# Patient Record
Sex: FEMALE | Race: WHITE | NOT HISPANIC OR LATINO | URBAN - METROPOLITAN AREA
[De-identification: names, ages, dates, MRNs, and addresses within clinical notes are randomized per-mention and may not be internally consistent; named-entity substitution may affect disease eponyms.]

---

## 2018-01-11 ENCOUNTER — EMERGENCY (EMERGENCY)
Facility: HOSPITAL | Age: 28
LOS: 0 days | Discharge: HOME | End: 2018-01-11
Admitting: GENERAL PRACTICE

## 2018-01-11 DIAGNOSIS — S61.231A PUNCTURE WOUND WITHOUT FOREIGN BODY OF LEFT INDEX FINGER WITHOUT DAMAGE TO NAIL, INITIAL ENCOUNTER: ICD-10-CM
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
## 2018-10-03 ENCOUNTER — TRANSCRIPTION ENCOUNTER (OUTPATIENT)
Age: 28
End: 2018-10-03

## 2019-04-03 ENCOUNTER — INPATIENT (INPATIENT)
Facility: HOSPITAL | Age: 29
LOS: 0 days | Discharge: HOME | End: 2019-04-04
Attending: SURGERY | Admitting: SURGERY
Payer: COMMERCIAL

## 2019-04-03 ENCOUNTER — RESULT REVIEW (OUTPATIENT)
Age: 29
End: 2019-04-03

## 2019-04-03 VITALS
TEMPERATURE: 98 F | HEART RATE: 99 BPM | RESPIRATION RATE: 20 BRPM | OXYGEN SATURATION: 100 % | DIASTOLIC BLOOD PRESSURE: 74 MMHG | SYSTOLIC BLOOD PRESSURE: 117 MMHG

## 2019-04-03 LAB
ALBUMIN SERPL ELPH-MCNC: 4.1 G/DL — SIGNIFICANT CHANGE UP (ref 3.5–5.2)
ALP SERPL-CCNC: 40 U/L — SIGNIFICANT CHANGE UP (ref 30–115)
ALT FLD-CCNC: 20 U/L — SIGNIFICANT CHANGE UP (ref 0–41)
ANION GAP SERPL CALC-SCNC: 17 MMOL/L — HIGH (ref 7–14)
APPEARANCE UR: ABNORMAL
APTT BLD: 28.3 SEC — SIGNIFICANT CHANGE UP (ref 27–39.2)
AST SERPL-CCNC: 28 U/L — SIGNIFICANT CHANGE UP (ref 0–41)
BACTERIA # UR AUTO: ABNORMAL /HPF
BASOPHILS # BLD AUTO: 0.04 K/UL — SIGNIFICANT CHANGE UP (ref 0–0.2)
BASOPHILS NFR BLD AUTO: 0.4 % — SIGNIFICANT CHANGE UP (ref 0–1)
BILIRUB DIRECT SERPL-MCNC: <0.2 MG/DL — SIGNIFICANT CHANGE UP (ref 0–0.2)
BILIRUB INDIRECT FLD-MCNC: >0.1 MG/DL — LOW (ref 0.2–1.2)
BILIRUB SERPL-MCNC: 0.3 MG/DL — SIGNIFICANT CHANGE UP (ref 0.2–1.2)
BILIRUB UR-MCNC: NEGATIVE — SIGNIFICANT CHANGE UP
BLD GP AB SCN SERPL QL: SIGNIFICANT CHANGE UP
BUN SERPL-MCNC: 16 MG/DL — SIGNIFICANT CHANGE UP (ref 10–20)
CALCIUM SERPL-MCNC: 9.7 MG/DL — SIGNIFICANT CHANGE UP (ref 8.5–10.1)
CHLORIDE SERPL-SCNC: 103 MMOL/L — SIGNIFICANT CHANGE UP (ref 98–110)
CO2 SERPL-SCNC: 20 MMOL/L — SIGNIFICANT CHANGE UP (ref 17–32)
COLOR SPEC: YELLOW — SIGNIFICANT CHANGE UP
CREAT SERPL-MCNC: 0.9 MG/DL — SIGNIFICANT CHANGE UP (ref 0.7–1.5)
DIFF PNL FLD: NEGATIVE — SIGNIFICANT CHANGE UP
EOSINOPHIL # BLD AUTO: 0.11 K/UL — SIGNIFICANT CHANGE UP (ref 0–0.7)
EOSINOPHIL NFR BLD AUTO: 1 % — SIGNIFICANT CHANGE UP (ref 0–8)
EPI CELLS # UR: ABNORMAL /HPF
GLUCOSE SERPL-MCNC: 84 MG/DL — SIGNIFICANT CHANGE UP (ref 70–99)
GLUCOSE UR QL: NEGATIVE MG/DL — SIGNIFICANT CHANGE UP
HCT VFR BLD CALC: 37.5 % — SIGNIFICANT CHANGE UP (ref 37–47)
HGB BLD-MCNC: 12.9 G/DL — SIGNIFICANT CHANGE UP (ref 12–16)
IMM GRANULOCYTES NFR BLD AUTO: 0.2 % — SIGNIFICANT CHANGE UP (ref 0.1–0.3)
INR BLD: 0.96 RATIO — SIGNIFICANT CHANGE UP (ref 0.65–1.3)
KETONES UR-MCNC: NEGATIVE — SIGNIFICANT CHANGE UP
LEUKOCYTE ESTERASE UR-ACNC: ABNORMAL
LIDOCAIN IGE QN: 27 U/L — SIGNIFICANT CHANGE UP (ref 7–60)
LYMPHOCYTES # BLD AUTO: 46.5 % — SIGNIFICANT CHANGE UP (ref 20.5–51.1)
LYMPHOCYTES # BLD AUTO: 5 K/UL — HIGH (ref 1.2–3.4)
MCHC RBC-ENTMCNC: 29.7 PG — SIGNIFICANT CHANGE UP (ref 27–31)
MCHC RBC-ENTMCNC: 34.4 G/DL — SIGNIFICANT CHANGE UP (ref 32–37)
MCV RBC AUTO: 86.4 FL — SIGNIFICANT CHANGE UP (ref 81–99)
MONOCYTES # BLD AUTO: 0.78 K/UL — HIGH (ref 0.1–0.6)
MONOCYTES NFR BLD AUTO: 7.3 % — SIGNIFICANT CHANGE UP (ref 1.7–9.3)
NEUTROPHILS # BLD AUTO: 4.8 K/UL — SIGNIFICANT CHANGE UP (ref 1.4–6.5)
NEUTROPHILS NFR BLD AUTO: 44.6 % — SIGNIFICANT CHANGE UP (ref 42.2–75.2)
NITRITE UR-MCNC: NEGATIVE — SIGNIFICANT CHANGE UP
NRBC # BLD: 0 /100 WBCS — SIGNIFICANT CHANGE UP (ref 0–0)
PH UR: 6.5 — SIGNIFICANT CHANGE UP (ref 5–8)
PLATELET # BLD AUTO: 283 K/UL — SIGNIFICANT CHANGE UP (ref 130–400)
POTASSIUM SERPL-MCNC: 3.4 MMOL/L — LOW (ref 3.5–5)
POTASSIUM SERPL-SCNC: 3.4 MMOL/L — LOW (ref 3.5–5)
PROT SERPL-MCNC: 6.8 G/DL — SIGNIFICANT CHANGE UP (ref 6–8)
PROT UR-MCNC: NEGATIVE MG/DL — SIGNIFICANT CHANGE UP
PROTHROM AB SERPL-ACNC: 11 SEC — SIGNIFICANT CHANGE UP (ref 9.95–12.87)
RBC # BLD: 4.34 M/UL — SIGNIFICANT CHANGE UP (ref 4.2–5.4)
RBC # FLD: 12.4 % — SIGNIFICANT CHANGE UP (ref 11.5–14.5)
SODIUM SERPL-SCNC: 140 MMOL/L — SIGNIFICANT CHANGE UP (ref 135–146)
SP GR SPEC: 1.02 — SIGNIFICANT CHANGE UP (ref 1.01–1.03)
TYPE + AB SCN PNL BLD: SIGNIFICANT CHANGE UP
UROBILINOGEN FLD QL: 1 MG/DL (ref 0.2–0.2)
WBC # BLD: 10.75 K/UL — SIGNIFICANT CHANGE UP (ref 4.8–10.8)
WBC # FLD AUTO: 10.75 K/UL — SIGNIFICANT CHANGE UP (ref 4.8–10.8)
WBC UR QL: SIGNIFICANT CHANGE UP /HPF

## 2019-04-03 PROCEDURE — 76705 ECHO EXAM OF ABDOMEN: CPT | Mod: 26

## 2019-04-03 PROCEDURE — 47562 LAPAROSCOPIC CHOLECYSTECTOMY: CPT

## 2019-04-03 PROCEDURE — 93010 ELECTROCARDIOGRAM REPORT: CPT

## 2019-04-03 PROCEDURE — 88304 TISSUE EXAM BY PATHOLOGIST: CPT | Mod: 26

## 2019-04-03 PROCEDURE — 71046 X-RAY EXAM CHEST 2 VIEWS: CPT | Mod: 26

## 2019-04-03 PROCEDURE — 99223 1ST HOSP IP/OBS HIGH 75: CPT | Mod: 57

## 2019-04-03 PROCEDURE — 99285 EMERGENCY DEPT VISIT HI MDM: CPT | Mod: 25

## 2019-04-03 RX ORDER — MORPHINE SULFATE 50 MG/1
2 CAPSULE, EXTENDED RELEASE ORAL EVERY 4 HOURS
Qty: 0 | Refills: 0 | Status: DISCONTINUED | OUTPATIENT
Start: 2019-04-04 | End: 2019-04-04

## 2019-04-03 RX ORDER — HEPARIN SODIUM 5000 [USP'U]/ML
5000 INJECTION INTRAVENOUS; SUBCUTANEOUS EVERY 8 HOURS
Qty: 0 | Refills: 0 | Status: DISCONTINUED | OUTPATIENT
Start: 2019-04-04 | End: 2019-04-04

## 2019-04-03 RX ORDER — IBUPROFEN 200 MG
400 TABLET ORAL EVERY 8 HOURS
Qty: 0 | Refills: 0 | Status: DISCONTINUED | OUTPATIENT
Start: 2019-04-04 | End: 2019-04-04

## 2019-04-03 RX ORDER — SODIUM CHLORIDE 9 MG/ML
1000 INJECTION, SOLUTION INTRAVENOUS
Qty: 0 | Refills: 0 | Status: DISCONTINUED | OUTPATIENT
Start: 2019-04-03 | End: 2019-04-03

## 2019-04-03 RX ORDER — ONDANSETRON 8 MG/1
4 TABLET, FILM COATED ORAL ONCE
Qty: 0 | Refills: 0 | Status: COMPLETED | OUTPATIENT
Start: 2019-04-03 | End: 2019-04-03

## 2019-04-03 RX ORDER — SODIUM CHLORIDE 9 MG/ML
1000 INJECTION, SOLUTION INTRAVENOUS
Qty: 0 | Refills: 0 | Status: DISCONTINUED | OUTPATIENT
Start: 2019-04-04 | End: 2019-04-04

## 2019-04-03 RX ORDER — CHLORHEXIDINE GLUCONATE 213 G/1000ML
1 SOLUTION TOPICAL
Qty: 0 | Refills: 0 | Status: DISCONTINUED | OUTPATIENT
Start: 2019-04-03 | End: 2019-04-03

## 2019-04-03 RX ORDER — ACETAMINOPHEN 500 MG
650 TABLET ORAL EVERY 6 HOURS
Qty: 0 | Refills: 0 | Status: DISCONTINUED | OUTPATIENT
Start: 2019-04-04 | End: 2019-04-04

## 2019-04-03 RX ORDER — KETOROLAC TROMETHAMINE 30 MG/ML
30 SYRINGE (ML) INJECTION ONCE
Qty: 0 | Refills: 0 | Status: DISCONTINUED | OUTPATIENT
Start: 2019-04-03 | End: 2019-04-03

## 2019-04-03 RX ORDER — HYDROMORPHONE HYDROCHLORIDE 2 MG/ML
0.5 INJECTION INTRAMUSCULAR; INTRAVENOUS; SUBCUTANEOUS
Qty: 0 | Refills: 0 | Status: DISCONTINUED | OUTPATIENT
Start: 2019-04-03 | End: 2019-04-03

## 2019-04-03 RX ORDER — PANTOPRAZOLE SODIUM 20 MG/1
40 TABLET, DELAYED RELEASE ORAL
Qty: 0 | Refills: 0 | Status: DISCONTINUED | OUTPATIENT
Start: 2019-04-03 | End: 2019-04-03

## 2019-04-03 RX ORDER — CEFOTETAN DISODIUM 1 G
1 VIAL (EA) INJECTION ONCE
Qty: 0 | Refills: 0 | Status: COMPLETED | OUTPATIENT
Start: 2019-04-04 | End: 2019-04-04

## 2019-04-03 RX ORDER — FAMOTIDINE 10 MG/ML
20 INJECTION INTRAVENOUS ONCE
Qty: 0 | Refills: 0 | Status: COMPLETED | OUTPATIENT
Start: 2019-04-03 | End: 2019-04-03

## 2019-04-03 RX ORDER — PANTOPRAZOLE SODIUM 20 MG/1
40 TABLET, DELAYED RELEASE ORAL
Qty: 0 | Refills: 0 | Status: DISCONTINUED | OUTPATIENT
Start: 2019-04-04 | End: 2019-04-04

## 2019-04-03 RX ORDER — SODIUM CHLORIDE 9 MG/ML
1000 INJECTION INTRAMUSCULAR; INTRAVENOUS; SUBCUTANEOUS ONCE
Qty: 0 | Refills: 0 | Status: COMPLETED | OUTPATIENT
Start: 2019-04-03 | End: 2019-04-03

## 2019-04-03 RX ORDER — MORPHINE SULFATE 50 MG/1
4 CAPSULE, EXTENDED RELEASE ORAL ONCE
Qty: 0 | Refills: 0 | Status: DISCONTINUED | OUTPATIENT
Start: 2019-04-03 | End: 2019-04-03

## 2019-04-03 RX ORDER — CHLORHEXIDINE GLUCONATE 213 G/1000ML
1 SOLUTION TOPICAL
Qty: 0 | Refills: 0 | Status: DISCONTINUED | OUTPATIENT
Start: 2019-04-04 | End: 2019-04-04

## 2019-04-03 RX ORDER — HEPARIN SODIUM 5000 [USP'U]/ML
5000 INJECTION INTRAVENOUS; SUBCUTANEOUS EVERY 8 HOURS
Qty: 0 | Refills: 0 | Status: DISCONTINUED | OUTPATIENT
Start: 2019-04-03 | End: 2019-04-03

## 2019-04-03 RX ORDER — HYDROMORPHONE HYDROCHLORIDE 2 MG/ML
1 INJECTION INTRAMUSCULAR; INTRAVENOUS; SUBCUTANEOUS
Qty: 0 | Refills: 0 | Status: DISCONTINUED | OUTPATIENT
Start: 2019-04-03 | End: 2019-04-03

## 2019-04-03 RX ADMIN — ONDANSETRON 4 MILLIGRAM(S): 8 TABLET, FILM COATED ORAL at 07:32

## 2019-04-03 RX ADMIN — HYDROMORPHONE HYDROCHLORIDE 0.5 MILLIGRAM(S): 2 INJECTION INTRAMUSCULAR; INTRAVENOUS; SUBCUTANEOUS at 22:42

## 2019-04-03 RX ADMIN — ONDANSETRON 4 MILLIGRAM(S): 8 TABLET, FILM COATED ORAL at 23:02

## 2019-04-03 RX ADMIN — SODIUM CHLORIDE 100 MILLILITER(S): 9 INJECTION, SOLUTION INTRAVENOUS at 15:18

## 2019-04-03 RX ADMIN — SODIUM CHLORIDE 1000 MILLILITER(S): 9 INJECTION INTRAMUSCULAR; INTRAVENOUS; SUBCUTANEOUS at 07:33

## 2019-04-03 RX ADMIN — FAMOTIDINE 104 MILLIGRAM(S): 10 INJECTION INTRAVENOUS at 07:33

## 2019-04-03 RX ADMIN — MORPHINE SULFATE 4 MILLIGRAM(S): 50 CAPSULE, EXTENDED RELEASE ORAL at 07:33

## 2019-04-03 NOTE — ED ADULT NURSE REASSESSMENT NOTE - NS ED NURSE REASSESS COMMENT FT1
pt resting comfortably, denies any discomfort. no distress noted at this time. iv intact, safety maintained, on monitor. VSS. awaiting OR.

## 2019-04-03 NOTE — ED PROVIDER NOTE - CLINICAL SUMMARY MEDICAL DECISION MAKING FREE TEXT BOX
27 yo female with abdominal pain due to cholelithiasis; LFTs WNL, seen by surgery , admit for cholecystectomy.

## 2019-04-03 NOTE — PRE-ANESTHESIA EVALUATION ADULT - NSANTHOSAYNRD_GEN_A_CORE
No. AIDA screening performed.  STOP BANG Legend: 0-2 = LOW Risk; 3-4 = INTERMEDIATE Risk; 5-8 = HIGH Risk

## 2019-04-03 NOTE — H&P ADULT - ASSESSMENT
ASSESSMENT:  28y Female with acute onset epigastric and RUQ abdominal pain mild tenderness on exam after receiving morphine in ED, US findings of large stones and SMS+.     PLAN:   NPO  IVF, BL IV access  GI DVT ppx  Cipro/flagyl  Pain control, morphine  Pre-op labs: T+S, Coags, EKG (NSR) and CXR  Will discuss surgical options, patient will benefit from laparoscopic cholecystectomy    Above plan discussed with Dr. Powers , patient, and ED team  --------------------------------------------------------------------------------------    04-03-19 @ 11:16

## 2019-04-03 NOTE — CHART NOTE - NSCHARTNOTEFT_GEN_A_CORE
PACU ANESTHESIA ADMISSION NOTE      Procedure: laparoscopic cholecystectomy  Post op diagnosis:  Acute cholecystitis      ____  Intubated  TV:______       Rate: ______      FiO2: ______    __x__  Patent Airway    ____  Full return of protective reflexes    ____  Full recovery from anesthesia / back to baseline status    Vitals:  T(C): 36.1   HR: 96   BP: 119/73  RR: 16  SpO2: 98%     Mental Status:  ___x_ Awake   _____ Alert   _____ Drowsy   _____ Sedated    Nausea/Vomiting:  ____ Yes, See Post - Op Orders      ___x_ No    Pain Scale (0-10):  _____    Treatment: ____ None    __x__ See Post - Op/PCA Orders    Post - Operative Fluids:   ____ Oral   ___x_ See Post - Op Orders    Plan: Discharge:   ____Home       ___x__Floor     _____Critical Care    _____  Other:_________________    Comments:  report given to RN

## 2019-04-03 NOTE — ED ADULT NURSE NOTE - OBJECTIVE STATEMENT
Pt reports she was at work and had a sharp cramping pain in the middle of the abdomen. Pt reports pain was so intense that it caused shortness of breath. Pt reports snacking a lot overnight. Denies vomiting, diarrhea, fever.

## 2019-04-03 NOTE — CONSULT NOTE ADULT - ASSESSMENT
ASSESSMENT:  28y Female with acute onset epigastric and RUQ abdominal pain mild tenderness on exam after receiving morphine in ED, US findings of large stones and SMS+.     PLAN:   NPO  IVF, BL IV access  Pain control, morphine  Pre-op labs: T+S, Coags, EKG (NSR) and CXR  Will discuss surgical options, patient will benefit from laparoscopic cholecystectomy    Above plan discussed with Dr. Powers , patient, and ED team    --------------------------------------------------------------------------------------    04-03-19 @ 11:16

## 2019-04-03 NOTE — ED PROVIDER NOTE - NS ED ROS FT
Constitutional: (-) fever, (-) chills, (+)diaphoresis  Eyes/ENT: (-) blurry vision, (-) epistaxis, (-) sore throat  Cardiovascular: (-) chest pain, (-) syncope  Respiratory: (-) cough, (-) shortness of breath  Gastrointestinal: (-) vomiting, (-) diarrhea, (+) epigastric pain, (-) black or bloody stools  : (-) dysuria or hematuria, no frequency or urgency   Musculoskeletal: (-) neck pain, (-) back pain, (-) joint pain  Integumentary: (-) rash, (-) edema  Neurological: (-) headache, (-) altered mental status, (-) focal weakness or paresthesias  Allergic/Immunologic: (-) pruritus

## 2019-04-03 NOTE — H&P ADULT - HISTORY OF PRESENT ILLNESS
HPI:   28y Female no PMH/PSH, presents with 1 hour of acute onset, sharp, aching pain in the epigastrium, radiating to the RUQ and back. Pain has been constant, associated diaphoresis and nausea. Pain resolved with morphine, zofran, and IVF in ED. No vomiting, fevers, chills, dairrhea. She has never had an episode of similar pain. No prior EGD/C-scope, not on NSAIDs and lifetime non-smoker.     PAST MEDICAL & SURGICAL HISTORY:  No pertinent past medical history  No significant past surgical history    Home Meds: Home Medications: none    Allergies: Allergies  No Known Allergies  Intolerances

## 2019-04-03 NOTE — H&P ADULT - NSHPLABSRESULTS_GEN_ALL_CORE
LABS  --------------------------------------------------------------------------------------  CAPILLARY BLOOD GLUCOSE                        12.9   10.75 )-----------( 283      ( 2019 07:22 )             37.5       Auto Immature Granulocyte %: 0.2 % (19 @ 07:22)  Auto Neutrophil %: 44.6 % (19 @ 07:22)      140  |  103  |  16  ----------------------------<  84  3.4<L>   |  20  |  0.9    Calcium, Total Serum: 9.7 mg/dL (19 @ 07:22)    LFTs:         6.8  | 0.3  | 28       ------------------[40      ( 2019 07:22 )  4.1  | <0.2 | 20          Lipase:27     Amylase:x        Coags: x    Urinalysis Basic - ( 2019 07:22 )  Color: Yellow / Appearance: Cloudy / S.020 / pH: x  Gluc: x / Ketone: Negative  / Bili: Negative / Urobili: 1.0 mg/dL   Blood: x / Protein: Negative mg/dL / Nitrite: Negative   Leuk Esterase: Small / RBC: x / WBC 3-5 /HPF   Sq Epi: x / Non Sq Epi: Occasional /HPF / Bacteria: Few /HPF    --------------------------------------------------------------------------------------  IMAGING RESULTS  < from: US Abdomen Limited (19 @ 08:28) >  IMPRESSION:  Cholelithiasis without gallbladder wall thickening or pericholecystic   fluid, however a positive sonographic Weldon sign was reported. A nuclear   medicine HIDA scan can be obtained to further evaluate for acute   cholecystitis.  < end of copied text >  ---------------------------------------------------------------------------------------

## 2019-04-03 NOTE — ED PROVIDER NOTE - OBJECTIVE STATEMENT
27 yo female without any significant PMH c/o acute onset of severe sharp constant epigastric pain radiating to the back and RUQ associated with nausea , diaphoresis. Onset of pain whole giving report to another nurse.  No such symptoms in th past.  No associated fever, chills, dizziness, CP, focal weakness, paresthesias, urinary complaints, vaginal bleeding.  No recent illness, LBM this morning, ate a [place] kiss this morning.  Will treat pain, give antiemetics, check labs, RUQ sono, reasess.

## 2019-04-03 NOTE — ED PROVIDER NOTE - PROGRESS NOTE DETAILS
Pain is gone, patient appears well, labs are pending. RUQ sono positive for stones, labs WNL, case d/c surgery resident who will evaluated the patient in ED. Patient was evaluated by surgery resident,  will wait for 4 hours post Morphine to evaluate if pain returns. Will admit for cholecystectomy.

## 2019-04-03 NOTE — H&P ADULT - NSHPPHYSICALEXAM_GEN_ALL_CORE
--------------------------------------------------------------------------------------  VITAL SIGNS, INS/OUTS (last 24 hours):  --------------------------------------------------------------------------------------  ICU Vital Signs Last 24 Hrs  T(C): 36.8 (03 Apr 2019 07:51), Max: 36.8 (03 Apr 2019 07:51)  T(F): 98.2 (03 Apr 2019 07:51), Max: 98.2 (03 Apr 2019 07:51)  HR: 99 (03 Apr 2019 07:51) (99 - 99)  BP: 117/74 (03 Apr 2019 07:51) (117/74 - 117/74)  RR: 20 (03 Apr 2019 07:51) (20 - 20)  SpO2: 100% (03 Apr 2019 07:51) (100% - 100%)  --------------------------------------------------------------------------------------  PHYSICAL EXAM  General: NAD, AAOx3, calm and cooperative  HEENT: NCAT, Neck supple  Cardiac: RRR S1, S2  Respiratory: CTAB, normal respiratory effort  Abdomen: Soft, non-distended, mild RUQ tenderness, no rebound or rigidity, no pepper sign, +bowel sounds  Skin: Warm/dry, normal color, no jaundice

## 2019-04-03 NOTE — CONSULT NOTE ADULT - SUBJECTIVE AND OBJECTIVE BOX
Consultation Note  =====================================================    HPI:   28y Female no PMH/PSH, presents with 1 hour of acute onset, sharp, aching pain in the epigastrium, radiating to the RUQ and back. Pain has been constant, associated diaphoresis and nausea. Pain resolved with morphine, zofran, and IVF in ED. No vomiting, fevers, chills, dairrhea. She has never had an episode of similar pain. No prior EGD/C-scope, not on NSAIDs and lifetime non-smoker.     PAST MEDICAL & SURGICAL HISTORY:  No pertinent past medical history  No significant past surgical history    Home Meds: Home Medications: none    Allergies: Allergies  No Known Allergies  Intolerances    Soc:   Denies Tobacco/EtOH/Substance use  Advanced Directives: Presumed Full Code     ROS:    REVIEW OF SYSTEMS  [ x ] A ten-point review of systems was otherwise negative except as noted.  [ ] Due to altered mental status/intubation, subjective information were not able to be obtained from the patient. History was obtained, to the extent possible, from review of the chart and collateral sources of information.    --------------------------------------------------------------------------------------  VITAL SIGNS, INS/OUTS (last 24 hours):  --------------------------------------------------------------------------------------  ICU Vital Signs Last 24 Hrs  T(C): 36.8 (2019 07:51), Max: 36.8 (2019 07:51)  T(F): 98.2 (2019 07:51), Max: 98.2 (2019 07:51)  HR: 99 (2019 07:51) (99 - 99)  BP: 117/74 (2019 07:51) (117/74 - 117/74)  RR: 20 (2019 07:51) (20 - 20)  SpO2: 100% (2019 07:51) (100% - 100%)  --------------------------------------------------------------------------------------  PHYSICAL EXAM  General: NAD, AAOx3, calm and cooperative  HEENT: NCAT, Neck supple  Cardiac: RRR S1, S2  Respiratory: CTAB, normal respiratory effort  Abdomen: Soft, non-distended, mild RUQ tenderness, no rebound or rigidity, no weldon sign, +bowel sounds  Skin: Warm/dry, normal color, no jaundice    LABS  --------------------------------------------------------------------------------------  CAPILLARY BLOOD GLUCOSE                        12.9   10.75 )-----------( 283      ( 2019 07:22 )             37.5       Auto Immature Granulocyte %: 0.2 % (19 @ 07:22)  Auto Neutrophil %: 44.6 % (19 @ 07:22)      140  |  103  |  16  ----------------------------<  84  3.4<L>   |  20  |  0.9    Calcium, Total Serum: 9.7 mg/dL (19 @ 07:22)    LFTs:         6.8  | 0.3  | 28       ------------------[40      ( 2019 07:22 )  4.1  | <0.2 | 20          Lipase:27     Amylase:x        Coags: x    Urinalysis Basic - ( 2019 07:22 )  Color: Yellow / Appearance: Cloudy / S.020 / pH: x  Gluc: x / Ketone: Negative  / Bili: Negative / Urobili: 1.0 mg/dL   Blood: x / Protein: Negative mg/dL / Nitrite: Negative   Leuk Esterase: Small / RBC: x / WBC 3-5 /HPF   Sq Epi: x / Non Sq Epi: Occasional /HPF / Bacteria: Few /HPF    --------------------------------------------------------------------------------------  IMAGING RESULTS  < from: US Abdomen Limited (19 @ 08:28) >  IMPRESSION:  Cholelithiasis without gallbladder wall thickening or pericholecystic   fluid, however a positive sonographic Wedlon sign was reported. A nuclear   medicine HIDA scan can be obtained to further evaluate for acute   cholecystitis.  < end of copied text >  ---------------------------------------------------------------------------------------

## 2019-04-04 ENCOUNTER — TRANSCRIPTION ENCOUNTER (OUTPATIENT)
Age: 29
End: 2019-04-04

## 2019-04-04 VITALS
RESPIRATION RATE: 18 BRPM | TEMPERATURE: 96 F | SYSTOLIC BLOOD PRESSURE: 115 MMHG | HEART RATE: 88 BPM | DIASTOLIC BLOOD PRESSURE: 65 MMHG

## 2019-04-04 RX ORDER — OXYCODONE HYDROCHLORIDE 5 MG/1
1 TABLET ORAL
Qty: 5 | Refills: 0 | OUTPATIENT
Start: 2019-04-04

## 2019-04-04 RX ORDER — ACETAMINOPHEN 500 MG
2 TABLET ORAL
Qty: 0 | Refills: 0 | COMMUNITY
Start: 2019-04-04

## 2019-04-04 RX ORDER — IBUPROFEN 200 MG
1 TABLET ORAL
Qty: 0 | Refills: 0 | COMMUNITY
Start: 2019-04-04

## 2019-04-04 RX ORDER — ACETAMINOPHEN 500 MG
650 TABLET ORAL EVERY 6 HOURS
Qty: 0 | Refills: 0 | Status: DISCONTINUED | OUTPATIENT
Start: 2019-04-04 | End: 2019-04-04

## 2019-04-04 RX ADMIN — Medication 650 MILLIGRAM(S): at 05:21

## 2019-04-04 RX ADMIN — Medication 650 MILLIGRAM(S): at 02:44

## 2019-04-04 RX ADMIN — PANTOPRAZOLE SODIUM 40 MILLIGRAM(S): 20 TABLET, DELAYED RELEASE ORAL at 05:22

## 2019-04-04 RX ADMIN — Medication 400 MILLIGRAM(S): at 05:40

## 2019-04-04 RX ADMIN — Medication 400 MILLIGRAM(S): at 05:22

## 2019-04-04 RX ADMIN — Medication 650 MILLIGRAM(S): at 03:15

## 2019-04-04 RX ADMIN — Medication 650 MILLIGRAM(S): at 05:40

## 2019-04-04 NOTE — PROGRESS NOTE ADULT - ASSESSMENT
28y Female s/p lap alexandra for acute cholecystitis     PLAN:   Will advance diet this AM   IVL when tolerating diet  Pain control  encourage ambulation and IS   possible d/c today if tolerating diet

## 2019-04-04 NOTE — DISCHARGE NOTE PROVIDER - NSDCCPCAREPLAN_GEN_ALL_CORE_FT
PRINCIPAL DISCHARGE DIAGNOSIS  Diagnosis: Acute cholecystitis  Assessment and Plan of Treatment: Continue regular diet.  Dressings : Remove outside dressing in 2 days, OK to shower normally. Leave steri-strips in place, they will fall off on their own in 1 week.  Pain : Take ibuprofen, tylenol around the clock (every 8 hours) for at least three days. Take oxycodone 5mg as needed for breakthrough pain. Please be aware, the medication can cause drowsiness, so reserve for night time use.   Activity : Please avoid heavy lifting (anything over 10 pounds) for at least 6 weeks.   Follow up : Call to schedule a follow up appointment in 2 weeks, [phone]

## 2019-04-04 NOTE — PROGRESS NOTE ADULT - ASSESSMENT
Assessment:  28y Female patient admitted S/P david herrmann    Plan:  -home meds  -pain control  -GI/DVT ppx  -encourage ambulation  -incentive spirometer  -regular diet  -IVL  -d/c home today    Date/Time: 04-04-19 @ 09:54

## 2019-04-04 NOTE — DISCHARGE NOTE NURSING/CASE MANAGEMENT/SOCIAL WORK - NSDCDPATPORTLINK_GEN_ALL_CORE
You can access the [facility] Patient Portal, offered by [facility], by registering with the following website: [email or web address]

## 2019-04-04 NOTE — PROGRESS NOTE ADULT - SUBJECTIVE AND OBJECTIVE BOX
Progress Note: Surgery  Patient: ANA MARIA BANUELOS , 28y (1990)Female   MRN: [id number]  Location: [place] [address] A  Visit: 04-03-19 Inpatient  Date: 04-04-19 @ 01:57    Procedure/Injury: s/p lap alexandra     Events over 24h: patient tolerated procedure well, ambulating post op, afebrile overnight, will advance diet this am    Vitals: T(F): 96.9 (04-04-19 @ 00:00), Max: 99.4 (04-03-19 @ 22:32)  HR: 80 (04-04-19 @ 00:00)  BP: 114/53 (04-04-19 @ 00:00) (103/62 - 129/61)  RR: 18 (04-04-19 @ 00:00)  SpO2: 96% (04-03-19 @ 23:02)    Bowel function:   Bowel movement [no]   Flatus [no]    Physical Examination:  General: NAD, lying in bed comfortably   HEENT: NCAT, BECCA, WNL  Heart: S1 S2, No MRG RRR   Lungs: CTABL +BS Equal BL, No WRC  Abdomen: Soft, non-distended, incisional tenderness.   Incisions/Wounds: Dressings in place, clean, dry and intact, no signs of infection/active bleeding/drainage    Medications: [Standing]  cefoTEtan  IVPB 1 Gram(s) IV Intermittent once    Medications:[PRN]    Labs:                        12.9   10.75 )-----------( 283      ( 03 Apr 2019 07:22 )             37.5     04-03    140  |  103  |  16  ----------------------------<  84  3.4<L>   |  20  |  0.9    Ca    9.7      03 Apr 2019 07:22    TPro  6.8  /  Alb  4.1  /  TBili  0.3  /  DBili  <0.2  /  AST  28  /  ALT  20  /  AlkPhos  40  04-03    LIVER FUNCTIONS - ( 03 Apr 2019 07:22 )  Alb: 4.1 g/dL / Pro: 6.8 g/dL / ALK PHOS: 40 U/L / ALT: 20 U/L / AST: 28 U/L / GGT: x           PT/INR - ( 03 Apr 2019 11:57 )   PT: 11.00 sec;   INR: 0.96 ratio    PTT - ( 03 Apr 2019 11:57 )  PTT:28.3 sec    Date/Time: 04-04-19 @ 01:57

## 2019-04-04 NOTE — PROGRESS NOTE ADULT - SUBJECTIVE AND OBJECTIVE BOX
Progress Note: General Surgery  Patient: ANA MARIA BANUELOS , 28y (1990)Female   MRN: [id number]  Location: [address]  Visit: 19 Inpatient  Date: 19 @ 09:54  Hospital Day: 2  Post-op Day: 1    Procedure/Diagnosis: s/p lap choley  Events over 24h: No acute overnight events, patient ambulating, tolerating regular diet, voiding with no issues.  Denies fever, chills, chest pain, shortness of breath, nausea, vomiting.    Vitals: T(F): 96.1 (19 @ 08:00), Max: 99.4 (19 @ 22:32)  HR: 88 (19 @ 08:00)  BP: 115/65 (19 @ 08:00) (103/62 - 129/61)  RR: 18 (19 @ 08:00)  SpO2: 96% (19 @ 23:02)    In:   19 @ 07:01  -  19 @ 07:00  --------------------------------------------------------  IN: 500 mL      Out:   19 @ 07:01  -  19 @ 07:00  --------------------------------------------------------  OUT:  Total OUT: 0 mL        Net:   19 @ 07:01  -  19 @ 07:00  --------------------------------------------------------  NET: 500 mL      Diet: Diet, Regular (19 @ 22:41)    Physical Examination:  General Appearance: NAD, alert and cooperative  HEENT: NCAT, WNL  Heart: S1 and S2. No murmurs. Rhythm is regular  Lungs: Clear to auscultation BL  Abdomen:  Positive bowel sounds. Soft, nondistended, pooja-incisional    Medications: [Standing]  acetaminophen   Tablet .. 650 milliGRAM(s) Oral every 6 hours  cefoTEtan  IVPB 1 Gram(s) IV Intermittent once  chlorhexidine 4% Liquid 1 Application(s) Topical <User Schedule>  heparin  Injectable 5000 Unit(s) SubCutaneous every 8 hours  ibuprofen  Tablet. 400 milliGRAM(s) Oral every 8 hours  pantoprazole    Tablet 40 milliGRAM(s) Oral before breakfast    DVT Prophylaxis: heparin  Injectable 5000 Unit(s) SubCutaneous every 8 hours    GI Prophylaxis: pantoprazole    Tablet 40 milliGRAM(s) Oral before breakfast    Antibiotics: cefoTEtan  IVPB 1 Gram(s) IV Intermittent once    Anticoagulation:   Medications:[PRN]  acetaminophen   Tablet .. 650 milliGRAM(s) Oral every 6 hours PRN  morphine  - Injectable 2 milliGRAM(s) IV Push every 4 hours PRN    Labs:                        12.9   10.75 )-----------( 283      ( 2019 07:22 )             37.5     04-03    140  |  103  |  16  ----------------------------<  84  3.4<L>   |  20  |  0.9    Ca    9.7      2019 07:22    TPro  6.8  /  Alb  4.1  /  TBili  0.3  /  DBili  <0.2  /  AST  28  /  ALT  20  /  AlkPhos  40  04-03    LIVER FUNCTIONS - ( 2019 07:22 )  Alb: 4.1 g/dL / Pro: 6.8 g/dL / ALK PHOS: 40 U/L / ALT: 20 U/L / AST: 28 U/L / GGT: x           PT/INR - ( 2019 11:57 )   PT: 11.00 sec;   INR: 0.96 ratio         PTT - ( 2019 11:57 )  PTT:28.3 sec        Urine/Micro:    Urinalysis Basic - ( 2019 07:22 )    Color: Yellow / Appearance: Cloudy / S.020 / pH: x  Gluc: x / Ketone: Negative  / Bili: Negative / Urobili: 1.0 mg/dL   Blood: x / Protein: Negative mg/dL / Nitrite: Negative   Leuk Esterase: Small / RBC: x / WBC 3-5 /HPF   Sq Epi: x / Non Sq Epi: Occasional /HPF / Bacteria: Few /HPF

## 2019-04-04 NOTE — DISCHARGE NOTE PROVIDER - CARE PROVIDER_API CALL
Kenroy Powers)  Surgery; Surgical Critical Care  [address] [place] [address], [address] [place] [address]  Phone: [phone]  Fax: [phone]  Follow Up Time:

## 2019-04-05 PROBLEM — Z78.9 OTHER SPECIFIED HEALTH STATUS: Chronic | Status: ACTIVE | Noted: 2019-04-03

## 2019-04-08 DIAGNOSIS — K80.00 CALCULUS OF GALLBLADDER WITH ACUTE CHOLECYSTITIS WITHOUT OBSTRUCTION: ICD-10-CM

## 2019-04-08 DIAGNOSIS — R10.9 UNSPECIFIED ABDOMINAL PAIN: ICD-10-CM

## 2019-04-08 LAB — SURGICAL PATHOLOGY STUDY: SIGNIFICANT CHANGE UP

## 2019-04-12 ENCOUNTER — APPOINTMENT (OUTPATIENT)
Dept: SURGERY | Facility: CLINIC | Age: 29
End: 2019-04-12
Payer: COMMERCIAL

## 2019-04-12 VITALS
SYSTOLIC BLOOD PRESSURE: 122 MMHG | BODY MASS INDEX: 33.99 KG/M2 | DIASTOLIC BLOOD PRESSURE: 76 MMHG | WEIGHT: 204 LBS | HEIGHT: 65 IN

## 2019-04-12 PROCEDURE — 99024 POSTOP FOLLOW-UP VISIT: CPT

## 2019-04-12 NOTE — PHYSICAL EXAM
[JVD] : no jugular venous distention  [Normal Breath Sounds] : Normal breath sounds [Abdominal Masses] : No abdominal masses [Abdomen Tenderness] : ~T ~M No abdominal tenderness [No HSM] : no hepatosplenomegaly [Tender] : was nontender [No Rash or Lesion] : No rash or lesion [Purpura] : no purpura  [Oriented to Person] : oriented to person [Alert] : alert [Oriented to Place] : oriented to place [Calm] : calm [Oriented to Time] : oriented to time [de-identified] : doing well PO , tolerating diet y [de-identified] : LIBERTAD [de-identified] : soft non tender \par umbilical incision with some induration and scant clear drainage , the other trocar sites healing well

## 2019-04-12 NOTE — ASSESSMENT
[FreeTextEntry1] : doing well \par keep umbilical incision dry and clean \par no heavy lifting \par f/u as needed

## 2019-04-26 ENCOUNTER — APPOINTMENT (OUTPATIENT)
Dept: SURGERY | Facility: CLINIC | Age: 29
End: 2019-04-26
Payer: COMMERCIAL

## 2019-04-26 VITALS
SYSTOLIC BLOOD PRESSURE: 122 MMHG | DIASTOLIC BLOOD PRESSURE: 76 MMHG | BODY MASS INDEX: 33.99 KG/M2 | HEIGHT: 65 IN | WEIGHT: 204 LBS

## 2019-04-26 DIAGNOSIS — Z82.49 FAMILY HISTORY OF ISCHEMIC HEART DISEASE AND OTHER DISEASES OF THE CIRCULATORY SYSTEM: ICD-10-CM

## 2019-04-26 DIAGNOSIS — Z78.9 OTHER SPECIFIED HEALTH STATUS: ICD-10-CM

## 2019-04-26 DIAGNOSIS — K80.00 CALCULUS OF GALLBLADDER WITH ACUTE CHOLECYSTITIS W/OUT OBSTRUCTION: ICD-10-CM

## 2019-04-26 DIAGNOSIS — Z00.00 ENCOUNTER FOR GENERAL ADULT MEDICAL EXAMINATION W/OUT ABNORMAL FINDINGS: ICD-10-CM

## 2019-04-26 PROCEDURE — 99024 POSTOP FOLLOW-UP VISIT: CPT

## 2019-04-26 RX ORDER — OXYCODONE 5 MG/1
5 TABLET ORAL
Qty: 5 | Refills: 0 | Status: DISCONTINUED | COMMUNITY
Start: 2019-04-04

## 2019-04-26 RX ORDER — METRONIDAZOLE 7.5 MG/G
0.75 GEL VAGINAL
Qty: 70 | Refills: 0 | Status: ACTIVE | COMMUNITY
Start: 2019-03-04

## 2019-04-26 NOTE — PHYSICAL EXAM
[JVD] : no jugular venous distention  [Normal Breath Sounds] : Normal breath sounds [Abdominal Masses] : No abdominal masses [Abdomen Tenderness] : ~T ~M No abdominal tenderness [No Rash or Lesion] : No rash or lesion [No HSM] : no hepatosplenomegaly [Alert] : alert [Oriented to Person] : oriented to person [Oriented to Time] : oriented to time [Oriented to Place] : oriented to place [de-identified] : doing well, tolerating diet  [Calm] : calm [de-identified] : LIBERTAD [de-identified] : incisions healed , no bulge or cellulitis

## 2019-04-26 NOTE — ASSESSMENT
[FreeTextEntry1] : doing well post op \par dry dressing to umbilicus \par no heavy lifting \par f/u PRN

## 2019-05-31 ENCOUNTER — EMERGENCY (EMERGENCY)
Facility: HOSPITAL | Age: 29
LOS: 0 days | Discharge: HOME | End: 2019-05-31
Admitting: EMERGENCY MEDICINE
Payer: COMMERCIAL

## 2019-05-31 VITALS
SYSTOLIC BLOOD PRESSURE: 126 MMHG | HEIGHT: 64 IN | DIASTOLIC BLOOD PRESSURE: 59 MMHG | TEMPERATURE: 97 F | HEART RATE: 80 BPM | WEIGHT: 190.04 LBS | RESPIRATION RATE: 18 BRPM | OXYGEN SATURATION: 100 %
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
## 2019-05-31 DIAGNOSIS — M79.676 PAIN IN UNSPECIFIED TOE(S): ICD-10-CM
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
## 2019-05-31 DIAGNOSIS — S99.921A UNSPECIFIED INJURY OF RIGHT FOOT, INITIAL ENCOUNTER: ICD-10-CM

## 2019-05-31 DIAGNOSIS — Z79.899 OTHER LONG TERM (CURRENT) DRUG THERAPY: ICD-10-CM
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
## 2019-05-31 PROCEDURE — 73630 X-RAY EXAM OF FOOT: CPT | Mod: 26,RT

## 2019-05-31 PROCEDURE — 99283 EMERGENCY DEPT VISIT LOW MDM: CPT | Mod: 25

## 2019-05-31 NOTE — ED ADULT NURSE NOTE - OBJECTIVE STATEMENT
pt sts an wooden chair fell on her right foot and now complaining of right big toe pain and swelling

## 2019-05-31 NOTE — ED PROVIDER NOTE - NSFOLLOWUPINSTRUCTIONS_ED_ALL_ED_FT
Musculoskeletal Pain    Musculoskeletal pain is muscle and bone aches and pains. This pain can occur in any part of the body.    Follow these instructions at home:  Only take medicines for pain, discomfort, or fever as told by your health care provider.  You may continue all activities unless the activities cause more pain. When the pain lessens, slowly resume normal activities. Gradually increase the intensity and duration of the activities or exercise.  During periods of severe pain, bed rest may be helpful. Lie or sit in any position that is comfortable, but get out of bed and walk around at least every several hours.  If directed, put ice on the injured area.    Put ice in a plastic bag.  Place a towel between your skin and the bag.  Leave the ice on for 20 minutes, 2–3 times a day.    Contact a health care provider if:  Your pain is getting worse.  Your pain is not relieved with medicines.  You lose function in the area of the pain if the pain is in your arms, legs, or neck.  This information is not intended to replace advice given to you by your health care provider. Make sure you discuss any questions you have with your health care provider.

## 2019-05-31 NOTE — ED PROVIDER NOTE - NS ED ROS FT
Review of Systems  Constitutional:  No fever, chills.  MS:  (+) right 1rst toe pain  Skin:  No skin rash, pruritis, jaundice, or lesions.  Neuro:  No numbness.  Endocrine: No history of thyroid disease or diabetes.

## 2019-05-31 NOTE — ED PROVIDER NOTE - CLINICAL SUMMARY MEDICAL DECISION MAKING FREE TEXT BOX
Pt presents with traumatic right first toe pain. Exam with mild TTP, swelling and ecchymosis. Xray negative for fracture/dislocation. Pain improved with motrin and ice. Recommended hard sole shoe-pt has at home. Ortho follow-up with, return precautions provided. Pt agreeable to d/c.

## 2019-05-31 NOTE — ED PROVIDER NOTE - OBJECTIVE STATEMENT
28 yo F with no significant PMHx presents to the ED c/o moderate right 1rst toe pain/swelling. Pain is sharp, constant and worse with movement. Pt accidentally dropped a wooden chair onto affected toe and has had pain since. Pt took 600 mg of Ibuprofen prior to arrival with mild relief of pain and she has been applying ice to area with improvement in initial swelling. Pt denies other areas of injury. She denies other complaints. Pt denies fever, chills, numbness, weakness.

## 2020-01-23 NOTE — PATIENT PROFILE ADULT - PRIMARY SOURCE OF SUPPORT/COMFORT
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 She presents today for follow up.    Since her last office visit, she was seen in the ED on 1/7/2020 following a syncopal episode. EMS found her to be hypotensive with a SBP of 90 while standing. Her BS was 135. Her ICD was not activated during her syncopal episode. Cardiac workup including EKG and lab work was unremarkable.    Today, the patient reports she has been taking 12.5 mg BID of carvedilol as discussed at her last follow up. She says prior to her recent syncopal episode she suddenly felt warm. She has not felt lightheaded or dizzy since that time. Denies lower extremity edema. Reports she checked her blood pressure last night and it was about 112/60. She offers no further complaints at this time.     PAST MEDICAL, FAMILY AND SOCIAL HISTORY     Patient Active Problem List   Diagnosis   • Chronic rhinitis   • Monoclonal paraproteinemia   • Malignant neoplasm of cervix uteri, unspecified site   • Tear film insufficiency, unspecified   • Senile nuclear sclerosis   • Unspecified disorder of refraction and accommodation   • Essential hypertension, benign   • Left 3rd [employer] Neuroma   • Osteoporosis, unspecified   • Anemia, unspecified   • Lumbago   • Osteoarthrosis, unspecified whether generalized or localized, lower leg   • Rhinitis, allergic   • Hematuria, microscopic   • Other and unspecified hyperlipidemia   • Malignant neoplasm of overlapping sites of right female breast (CMS/HCC)   • Decreased right shoulder range of motion   • Neutropenia, drug-induced (CMS/HCC)   • Drug-induced polyneuropathy (CMS/HCC)   •  Fx L lateral malleolus   • NSVT (nonsustained ventricular tachycardia) (CMS/HCC)   • Mitral regurgitation   • Chemotherapy-induced cardiomyopathy (CMS/HCC)   • [employer] ICD   • Acute on chronic heart failure (CMS/HCC)   • Multiple thyroid nodules     Medications: reviewed in EPIC.   ALLERGIES:   Allergen Reactions   • Sulfa Antibiotics ANAPHYLAXIS   • Bactrim      Lip swelling.     Social History     Socioeconomic History   • Marital status:      Spouse name: Not on file   • Number of children: 2   • Years of education: Not on file   • Highest education level: Not on file   Occupational History   • Occupation: retired   Social Needs   • Financial resource strain: Not on file   • Food insecurity:     Worry: Not on file     Inability: Not on file   • Transportation needs:     Medical: Not on file     Non-medical: Not on file   Tobacco Use   • Smoking status: Never Smoker   • Smokeless tobacco: Never Used   Substance and Sexual Activity   • Alcohol use: No     Alcohol/week: 0.0 standard drinks     Frequency: Never     Drinks per session: 1 or 2     Binge frequency: Never   • Drug use: No   • Sexual activity: Not Currently     Partners: Male   Lifestyle   • Physical activity:     Days per week: Not on file     Minutes per session: Not on file   • Stress: Not on file   Relationships   • Social connections:     Talks on phone: Not on file     Gets together: Not on file     Attends Orthodox service: Not on file     Active member of club or organization: Not on file     Attends meetings of clubs or organizations: Not on file     Relationship status: Not on file   • Intimate partner violence:     Fear of current or ex partner: Not on file     Emotionally abused: Not on file     Physically abused: Not on file     Forced sexual activity: Not on file   Other Topics Concern   •  Service Not Asked   • Blood Transfusions No   • Caffeine Concern Not Asked   • Occupational Exposure Not Asked   • Hobby  Hazards Not Asked   • Sleep Concern Not Asked   • Stress Concern Not Asked   • Weight Concern Not Asked   • Special Diet No   • Back Care Not Asked   • Exercise No   • Bike Helmet Not Asked   • Seat Belt Yes   • Self-Exams Yes   Social History Narrative   • Not on file       REVIEW OF SYSTEMS     Review of Systems otherwise negative, except as detail in HPI.    PHYSICAL EXAM     Vitals:    01/23/20 1326   BP: 90/60   Pulse: 86   Resp: 14   SpO2: 100%   Weight: 54.2 kg   Height: 5' 4\" (1.626 m)     Constitutional:  Well developed, well nourished, no acute distress, nontoxic appearance.  Eyes:  Conjunctivae normal.   HENT:  Atraumatic, external ears normal, nose normal,  Neck-no JVD (jugular venous distension), no carotid bruit, no thyromegaly.  Respiratory:  No respiratory distress, normal breath sounds, no rales, no wheezing.   Cardiovascular:  Normal rate, normal rhythm, no murmurs, no gallops, no rubs.   Gastrointestinal:  Soft, nondistended, normal bowel sounds.    Musculoskeletal:  No edema, no tenderness, no deformities. Back- no tenderness  Integument:  Well hydrated, no rash.   Neurologic:  Alert and oriented x 3, no focal deficits noted.  Psychiatric:  Speech and behavior appropriate.     IMAGING/RESULTS     Sodium (mmol/L)   Date Value   01/07/2020 138     Potassium (mmol/L)   Date Value   01/07/2020 4.2     Chloride (mmol/L)   Date Value   01/07/2020 109 (H)     Glucose (mg/dL)   Date Value   01/07/2020 89     CALCIUM (mg/dL)   Date Value   01/07/2020 8.6   12/06/2011 8.7     CO2 (mmol/L)   Date Value   12/06/2011 28     Carbon Dioxide (mmol/L)   Date Value   01/07/2020 23     BUN (mg/dL)   Date Value   01/07/2020 26 (H)     Creatinine (mg/dL)   Date Value   01/07/2020 1.34 (H)     CHOLESTEROL (mg/dL)   Date Value   03/09/2018 124     HDL (mg/dL)   Date Value   03/09/2018 52     CHOL/HDL (no units)   Date Value   03/09/2018 2.4     TRIGLYCERIDE (mg/dL)   Date Value   03/09/2018 49     CALCULATED LDL  (mg/dL)   Date Value   03/09/2018 62     CARDIAC DIAGNOSTICS:    Cardiac Catheterization 3/10/2018  1. Nonischemic cardiomyopathy possibly due to chemotherapy  2. Mild coronary luminal irregularities  3. Dilated LV with EF ~25% by LVgram. Elevated central hemodynamics with LVedp ~18mmHg  4. Will stop Plavix and heparin.  5. Plan to resume and then up titrate standard heart failure medications as tolerated.      Cardiac MRI 3/12/2018   IMPRESSION:  Findings are consistent with dilated cardiomyopathy with LVEDV measuring 118 mL/m2, LVEF 18%. ECV is elevated and ranges from 34-37%. Native Q2rbajjf are elevated measuring 9756-2906 ms. no myocardial edema is seen.  Incidental questionable single segment myocardial infarction, likely chronic, with less than 25% transmural extent involving the basal inferolateral wall with associated akinesis. Total wall thickness is 6 mm.  There is no myocardial edema.  Significant mitral and tricuspid regurgitation. Left atrial enlargement.     Echocardiogram 10/1/2018  Severely increased left ventricular cavity size (6.1 cm).  Severe global left ventricular hypokinesis, EF 22%. GLS -5%.  Grade II/IV diastolic dysfunction, moderately elevated filling pressures.  Eccentric posteriorly directed moderate mitral regurgitation.  Severely increased left atrial size.  Trivial pericardial effusion.  No significant change since previous study (09.01.18).    Echocardiogram 2/4/2019  Left ventricular ejection fraction, 28 %. Global left ventricular hypokinesis. Global longitudinal strain -7 %.  Grade I/IV diastolic dysfunction (abnormal relaxation filling pattern), normal to mildly elevated filling pressures.  Normal right ventricular size and systolic function.  Normal right ventricular systolic pressure.  Left atrial volume index 41 ml/m².  Moderate eccentric mitral valve regurgitation , directed posteriorly .  No pericardial effusion.  Mildly dilated ascending aorta.  Compared to prior study ,  dated 10/1/18 , LVEF is slightly better now .    Echocardiogram, Limited 8/23/2019  Limited TTE .  Left ventricular ejection fraction, 30 %. Global left ventricular hypokinesis. Global longitudinal strain -9.0 %.  Grade I/IV diastolic dysfunction (abnormal relaxation filling pattern), normal to mildly elevated filling pressures.  Normal right ventricular size. Low normal right ventricular systolic function.  Moderate mitral valve regurgitation.  No pericardial effusion.  No significant change since the prior study , dated 2/4/19 .      Echo 10/22/2019 - [facility]   Left ventricle is mildly enlarged in size with severe global systolic  dysfunction.  The quantitative LVEF based on modified Clark's method is 25%.  The left ventricular mass indexed to body surface area and based on gender is  severely and eccentrically increased.  There is moderate (grade II) diastolic dysfunction with elevated mean left   atrial  pressure at 15 mmHg or higher  Right ventricle is normal size with normal systolic function.  Mild left atrial enlargement.  There is moderate mitral regurgitation secondary to annular and LV dilation.  A trace pericardial effusion appears present.    ASSESSMENT/PLAN     1. Nonischemic dilated cardiomyopathy: Cardiomyopathy likely caused by adriamycin in setting of breast and cervical cancer. S/p ICD 8/2018 with Dr. Aragon.  ICD followed by GUDELIA Haile. Echo 8/2019 with EF 30%. Currently taking carvedilol 12.5 mg BID, losartan 12.5 mg daily, spironolactone 12.5 mg daily, and furosemide 20 mg PRN.   2. Hypertension: Blood pressure 90/60 today. Currently taking carvedilol 12.5 mg BID and spironolactone 12.5 mg, losartan 12.5 mg daily.  3. Coronary artery disease: Mild per cath in 3/2018. Denies chest pain. Currently taking aspirin 81 mg daily and Coreg 12.5 mg BID.  4. Mitral regurgitation: Echo 10/2019 at [facility] noted moderate regurgitation, stable from moderate eccentric mitral valve  regurgitation, directed posteriorly (Echo 10/2018). Will continue to monitor.   5. Syncope: Syncopal episode in 1/2019, likely vasovagal. Will continue to monitor.    Plan:  Continue current medication regimen and risk factor modification.  Patient will have a Limited Echo just prior to f/u to reassess EF.      Return in about 3 months (around 4/23/2020). or sooner if experiencing new or worsening symptoms.      On 1/23/2020, Adrienne IRWIN scribed the services personally performed by Serge Reich MD      The documentation recorded by the scribe accurately and completely reflects the service(s) I personally performed and the decisions made by me.            Serge Reich MD, FACC, [facility]  Clinical Adjunct   Department of Medicine  [employer] [facility] [employer]  [facility]        parent

## 2020-03-14 ENCOUNTER — EMERGENCY (EMERGENCY)
Facility: HOSPITAL | Age: 30
LOS: 0 days | Discharge: HOME | End: 2020-03-14
Attending: EMERGENCY MEDICINE | Admitting: EMERGENCY MEDICINE
Payer: COMMERCIAL

## 2020-03-14 VITALS — HEART RATE: 90 BPM

## 2020-03-14 VITALS
SYSTOLIC BLOOD PRESSURE: 130 MMHG | DIASTOLIC BLOOD PRESSURE: 73 MMHG | TEMPERATURE: 98 F | RESPIRATION RATE: 16 BRPM | OXYGEN SATURATION: 100 % | HEART RATE: 104 BPM

## 2020-03-14 DIAGNOSIS — R05 COUGH: ICD-10-CM

## 2020-03-14 DIAGNOSIS — J02.9 ACUTE PHARYNGITIS, UNSPECIFIED: ICD-10-CM

## 2020-03-14 DIAGNOSIS — Z20.828 CONTACT WITH AND (SUSPECTED) EXPOSURE TO OTHER VIRAL COMMUNICABLE DISEASES: ICD-10-CM

## 2020-03-14 LAB
FLU A RESULT: NEGATIVE — SIGNIFICANT CHANGE UP
FLU A RESULT: NEGATIVE — SIGNIFICANT CHANGE UP
FLUAV AG NPH QL: NEGATIVE — SIGNIFICANT CHANGE UP
FLUBV AG NPH QL: NEGATIVE — SIGNIFICANT CHANGE UP
RSV RESULT: NEGATIVE — SIGNIFICANT CHANGE UP
RSV RNA RESP QL NAA+PROBE: NEGATIVE — SIGNIFICANT CHANGE UP

## 2020-03-14 PROCEDURE — 71045 X-RAY EXAM CHEST 1 VIEW: CPT | Mod: 26

## 2020-03-14 PROCEDURE — 99284 EMERGENCY DEPT VISIT MOD MDM: CPT

## 2020-03-14 RX ORDER — ACETAMINOPHEN 500 MG
650 TABLET ORAL ONCE
Refills: 0 | Status: COMPLETED | OUTPATIENT
Start: 2020-03-14 | End: 2020-03-14

## 2020-03-14 RX ORDER — DEXAMETHASONE 0.5 MG/5ML
10 ELIXIR ORAL ONCE
Refills: 0 | Status: COMPLETED | OUTPATIENT
Start: 2020-03-14 | End: 2020-03-14

## 2020-03-14 RX ADMIN — Medication 650 MILLIGRAM(S): at 21:33

## 2020-03-14 RX ADMIN — Medication 10 MILLIGRAM(S): at 21:33

## 2020-03-14 NOTE — ED PROVIDER NOTE - PHYSICAL EXAMINATION
Vital Signs: Reviewed  GEN: alert, NAD  HEAD:  normocephalic, atraumatic  EYES:  PERRLA; conjunctivae without injection, drainage or discharge  ENMT:  nasal mucosa moist; mouth moist without ulcerations or lesions; throat moist w/ mild erythema, no exudate ulcerations or lesions  NECK:  supple, no masses  CARDIAC:  regular rate, normal S1 and S2, no murmurs, rubs or gallops  RESP:  respiratory rate and effort appear normal for age; lungs are clear to auscultation bilaterally; no rales or wheezes  ABDOMEN:  soft, nontender, nondistended  MUSCULOSKELETAL/NEURO:  normal movement, normal tone  SKIN:  normal skin color for age and race, well-perfused; warm and dry

## 2020-03-14 NOTE — ED PROVIDER NOTE - CARE PLAN
Principal Discharge DX:	Cough  Secondary Diagnosis:	Sore throat  Secondary Diagnosis:	Exposure, initial encounter

## 2020-03-14 NOTE — ED PROVIDER NOTE - OBJECTIVE STATEMENT
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 Pt denies SOB, n/v/d.

## 2020-03-14 NOTE — ED PROVIDER NOTE - NS ED ROS FT
Review of Systems:  	•	CONSTITUTIONAL - no fever, no diaphoresis  	•	SKIN - no rash, no lesions  	•	HEMATOLOGIC - no bleeding, no bruising  	•	EYES - no discharge, no injection  	•	ENT - +sore throat, no rhinorrhea  	•	RESPIRATORY - no shortness of breath, +cough  	•	CARDIAC - no chest pain, no palpitations  	•	GI - no abd pain, no nausea, no vomiting, no diarrhea  	•	GENITO-URINARY - no dysuria, no hematuria  	•	MUSCULOSKELETAL - +myalgias, no swelling  	•	NEUROLOGIC - no dizziness, no headache

## 2020-03-14 NOTE — ED PROVIDER NOTE - ATTENDING CONTRIBUTION TO CARE
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    CONSTITUTIONAL: Well-developed; well-nourished; in no acute distress. Sitting up and providing appropriate history and physical examination  SKIN: skin exam is warm and dry, no acute rash.  HEAD: Normocephalic; atraumatic.  EYES: PERRL, 3 mm bilateral, no nystagmus, EOM intact; conjunctiva and sclera clear.  ENT: No nasal discharge; airway clear.  NECK: Supple; non tender. + full passive ROM in all directions. No JVD  CARD: S1, S2 normal; no murmurs, gallops, or rubs. Regular rate and rhythm. + Symmetric Strong Pulses  RESP: No wheezes, rales or rhonchi. Good air movement bilaterally  ABD: soft; non-distended; non-tender. No Rebound, No Guarding, No signs of peritonitis, No CVA tenderness. No pulsatile abdominal mass. + Strong and Symmetric Pulses  EXT: Normal ROM. No clubbing, cyanosis or edema. Dp and Pt Pulses intact. Cap refill less than 3 seconds  NEURO: CN 2-12 intact, normal finger to nose, normal romberg, stable gait, no sensory or motor deficits, Alert, oriented, grossly unremarkable. No Focal deficits. GCS 15. NIH 0  PSYCH: Cooperative, appropriate.

## 2020-03-14 NOTE — ED PROVIDER NOTE - PATIENT PORTAL LINK FT
You can access the FollowMyHealth Patient Portal offered by [facility] by registering at the following website: [email or web address]. By joining [facility]’s FollowMyHealth portal, you will also be able to view your health information using other applications (apps) compatible with our system.

## 2020-03-14 NOTE — ED ADULT TRIAGE NOTE - CHIEF COMPLAINT QUOTE
Pt received in no acute distress, requesting for COVID-19 after being recommended by Employee health. Pt reports fevers, cough, sore throat, and body aches with intermittent nausea today. Denies chest pain, sob, abd pain, nausea, vomiting, and diarrhea. Has been taking Tylenol and Mortin last dose at approximately 3-4pm.  Pt transferred to negative pressure isolation room. Throat redness noted.

## 2020-03-18 LAB — SARS-COV-2 RNA SPEC QL NAA+PROBE: SIGNIFICANT CHANGE UP

## 2020-04-26 ENCOUNTER — MESSAGE (OUTPATIENT)
Age: 30
End: 2020-04-26

## 2020-05-03 ENCOUNTER — APPOINTMENT (OUTPATIENT)
Dept: DISASTER EMERGENCY | Facility: HOSPITAL | Age: 30
End: 2020-05-03

## 2020-05-04 LAB
SARS-COV-2 IGG SERPL IA-ACNC: <0.1 INDEX
SARS-COV-2 IGG SERPL QL IA: NEGATIVE

## 2020-09-25 NOTE — ED ADULT NURSE NOTE - NO SIGNIFICANT PAST SURGICAL HISTORY
Pt states she maintained quarantine after being swabbed.  
<<----- Click to add NO significant Past Surgical History

## 2021-03-18 ENCOUNTER — TRANSCRIPTION ENCOUNTER (OUTPATIENT)
Age: 31
End: 2021-03-18

## 2021-04-13 NOTE — ED PROVIDER NOTE - PHYSICAL EXAMINATION
Vital Signs: I have reviewed the initial vital signs.  Constitutional: well-nourished, appears stated age, no moderated distress due to pain, diaphoretic  HEENT: PERRL, pink conjunctivae, mmm, nml phonation  Cardiovascular: regular rate, regular rhythm, well-perfused extremities, distal pulses intact  Respiratory: unlabored respiratory effort, clear to auscultation bilaterally, equal air entry  Gastrointestinal: soft, non-distended abdomen, no pulsatile mass, no palpable organomegaly, + epigastric /RUQ ttp  Musculoskeletal: supple neck, no lower extremity edema or calf ttp  Integumentary: warm, dry, no rash  Neurologic: awake, alert x3, no gross neuro deficits  Psychiatric: appropriate mood, appropriate affect Vital Signs: I have reviewed the initial vital signs.  Constitutional: well-nourished, appears stated age, no moderated distress due to pain, diaphoretic  HEENT: PERRL, pink conjunctivae, mmm, nml phonation  Cardiovascular: regular rate, regular rhythm, well-perfused extremities, distal pulses intact  Respiratory: unlabored respiratory effort, clear to auscultation bilaterally, equal air entry  Gastrointestinal: soft, non-distended abdomen, no pulsatile mass, no palpable organomegaly, + epigastric /RUQ ttp., no CVA ttp  Musculoskeletal: supple neck, no lower extremity edema or calf ttp, no midline spine ttp  Integumentary: warm, dry, no rash  Neurologic: awake, alert x3, no gross neuro deficits  Psychiatric: appropriate mood, appropriate affect Asc Procedure Text (A): After obtaining clear surgical margins the patient was sent to an ASC for surgical repair.  The patient understands they will receive post-surgical care and follow-up from the ASC physician.

## 2021-09-10 ENCOUNTER — TRANSCRIPTION ENCOUNTER (OUTPATIENT)
Age: 31
End: 2021-09-10

## 2022-01-26 ENCOUNTER — EMERGENCY (EMERGENCY)
Facility: HOSPITAL | Age: 32
LOS: 0 days | Discharge: HOME | End: 2022-01-26
Attending: EMERGENCY MEDICINE | Admitting: EMERGENCY MEDICINE
Payer: OTHER MISCELLANEOUS

## 2022-01-26 VITALS
RESPIRATION RATE: 17 BRPM | TEMPERATURE: 98 F | HEIGHT: 64 IN | OXYGEN SATURATION: 99 % | DIASTOLIC BLOOD PRESSURE: 78 MMHG | HEART RATE: 82 BPM | SYSTOLIC BLOOD PRESSURE: 122 MMHG

## 2022-01-26 DIAGNOSIS — J02.9 ACUTE PHARYNGITIS, UNSPECIFIED: ICD-10-CM

## 2022-01-26 DIAGNOSIS — R06.7 SNEEZING: ICD-10-CM

## 2022-01-26 DIAGNOSIS — Z20.822 CONTACT WITH AND (SUSPECTED) EXPOSURE TO COVID-19: ICD-10-CM

## 2022-01-26 LAB — SARS-COV-2 RNA SPEC QL NAA+PROBE: SIGNIFICANT CHANGE UP

## 2022-01-26 PROCEDURE — 99283 EMERGENCY DEPT VISIT LOW MDM: CPT

## 2022-01-26 NOTE — ED PROVIDER NOTE - OBJECTIVE STATEMENT
30 y/o female with no significant PMH presents to the ED for evaluation of sore throat and sneezing. pt denies fever, chills, cough, sob, chest pain, weakness, recent travel, abdominal pain, or n/v/d/c.

## 2022-01-26 NOTE — ED PROVIDER NOTE - NS ED ROS FT
CONST: No fever, chills or bodyaches  EYES: No pain, redness, drainage or visual changes.  ENT: No ear pain or discharge, nasal discharge or congestion. (+) sore throat  CARD: No chest pain, palpitations  RESP: No SOB, cough, hemoptysis. No hx of asthma or COPD  GI: No abdominal pain, N/V/D  : No urinary symptoms  MS: No joint pain, back pain or extremity pain/injury  SKIN: No rashes  NEURO: No headache, dizziness, paresthesias or LOC

## 2022-01-26 NOTE — ED PROVIDER NOTE - PHYSICAL EXAMINATION
Physical Exam    Vital Signs: I have reviewed the initial vital signs.  Constitutional: well-nourished, appears stated age, no acute distress  Eyes: Conjunctiva pink, Sclera clear, PERRLA, EOMI without pain.  ENT: Oropharynx is clear without lesions. uvula midline. no tonsillar erythema, edema, or exudates. no stridor. no pta. no muffled voice. tolerating secretions.   Cardiovascular: S1 and S2, regular rate, regular rhythm, well-perfused extremities, radial pulses equal and 2+ b/l.   Respiratory: unlabored respiratory effort, clear to auscultation bilaterally no wheezing, rales and rhonchi. pt is speaking full sentences. no accessory muscle use.   Musculoskeletal: FROM of b/l upper and lower extremities   Integumentary: warm, dry, no rash  Neurologic: awake, alert, steady gait.   Psychiatric: appropriate mood, appropriate affect

## 2022-01-26 NOTE — ED PROVIDER NOTE - PATIENT PORTAL LINK FT
You can access the FollowMyHealth Patient Portal offered by [facility] by registering at the following website: [email or web address]. By joining [facility]’s FollowMyHealth portal, you will also be able to view your health information using other applications (apps) compatible with our system.

## 2022-01-26 NOTE — ED PROVIDER NOTE - CLINICAL SUMMARY MEDICAL DECISION MAKING FREE TEXT BOX
pt presenting for covid test. +sore throat, sneezing. no other uri sx or complaints. Well appearing, NAD, non toxic. NCAT PERRLA EOMI Airway intact, no drooling, no stridor, no pooling of secretions, no posterior oropharyngeal edema/lesions. Speaking in full sentences. +tolerating secretions, tolerating PO.

## 2022-06-06 ENCOUNTER — NON-APPOINTMENT (OUTPATIENT)
Age: 32
End: 2022-06-06

## 2022-06-28 NOTE — ED PROVIDER NOTE - CARE PROVIDER_API CALL
[facility] Medicine Progress Note        Chief Complaint: Inpatient follow-up on uncontrolled diabetes mellitus     HPI:   Patient is a 75-year-old white female with IDDM 2 presents to the ER with complaints of persistent hyperglycemia.  She has a history of mild dementia, IDDM 2, hypertension , insomnia and her daughter at bedside assists greatly with the history.  She explains that her PCP is attempting to increase her outpatient regimen of Lantus.  She has been taking 30 units in the morning for quite some time but he recently increased to 60 units in the morning last week.  Despite this change she continues to have readings that are over 400 and often times just read as too high to read.  She does not take short-acting insulin.  On presentation to the ER she was afebrile and hemodynamically stable but her blood glucose was noted to be 700 which did improve with administration of subcutaneous insulin.  Daughter at bedside is concerned because she has to drive to her house every day to administer her morning insulin and she stays there throughout the day to monitor blood sugar.  She also states that the patient does sometimes become lethargic when her blood glucose level drops to the 200s.        Patient is sitting up in bed awake and alert having just eaten lunch.  She is without any acute complaints.  Her daughter and son-in-law are present in the room.  Patient is afebrile, on room air, and hemodynamically stable.  Her capillary blood glucose levels are improved but still suboptimally controlled.  Her daughter showed me paperwork showing that her primary care provider Dr. Anatoly Priest has been trying to get her admitted to [facility] due to failure to thrive.  She has been in and out of the hospital over the past few months with uncontrolled blood glucose levels and her diabetic regimen has been changed multiple times with the last change being an  increase to Lantus 60 units daily.     Patient currently being managed for uncontrolled blood glucose in a diabetic mellitus patient      Interval Hx:   Patient seen and examined at bedside  She has no complaints today  Vitals reviewed and stable.  Glucose control is improving  PT/OT      Objective/physical exam:  General:  Elderly obese white female in no acute distress  HENT: normocephalic, atraumatic  Eye: PERRL, EOMI, clear conjunctiva  Neck: full ROM, no thyromegaly, no JVD  Respiratory: clear to auscultation bilaterally  Cardiovascular: regular rate and rhythm  Gastrointestinal: non-distended, positive bowel sounds, non-tender  Musculoskeletal: no gross deformity  Integumentary: warm, dry, intact, no rashes  Neurological: cranial nerves grossly intact, no focal neurological deficit  Psychiatric: cooperative, poor insight into medical condition    Assessment/Plan:  Insulin-dependent type 2 diabetes mellitus, uncontrolled  Dementia  Failure to thrive  Pseudohyponatremia, resolved  Obesity     Plan:  Vitals reviewed and stable.  Glucose control is improving  Decrease Levemir to 70 units twice daily  Start scheduled preprandial insulin 3 times daily.  Placed a consultation to Physical therapy and Occupational therapy  Placed a consultation to Case Management for skilled nursing facility placement     Patient condition:  Stable        Anticipated discharge and Disposition:  Skilled nursing facility     All diagnosis and differential diagnosis have been reviewed; assessment and plan has been documented; I have personally reviewed the labs and test results that are presently available; I have reviewed the patients medication list; I have reviewed the consulting providers response and recommendations. I have reviewed or attempted to review medical records based upon their availability     All of the patient's questions have been  addressed and answered. Patient's is agreeable to the above stated plan. I will continue  to monitor closely and make adjustments to medical management as needed.      VITAL SIGNS: 24 HRS MIN & MAX LAST   Temp  Min: 97.6 °F (36.4 °C)  Max: 97.8 °F (36.6 °C) 97.6 °F (36.4 °C)   BP  Min: 123/72  Max: 140/77 136/80   Pulse  Min: 85  Max: 91  89   Resp  Min: 18  Max: 18 18   SpO2  Min: 92 %  Max: 97 % 97 %       Recent Labs   Lab 06/23/22 2024 06/24/22  0607   WBC 6.5 7.0   RBC 4.77 4.86   HGB 14.0 14.3   HCT 42.5 42.5   MCV 89.1 87.4   MCH 29.4 29.4   MCHC 32.9* 33.6   RDW 13.0 13.1    267   MPV 11.0 10.9       Recent Labs   Lab 06/23/22 2024 06/24/22  0607   * 137   K 4.2 4.1   CO2 25 23   BUN 19.2 14.0   CREATININE 1.29* 0.77   CALCIUM 9.8 9.4   ALBUMIN 3.6 3.4   ALKPHOS 159* 140   ALT 49 44   AST 37* 41*   BILITOT 0.5 0.5          Microbiology Results (last 7 days)     ** No results found for the last 168 hours. **           See below for Radiology    Scheduled Med:   citalopram  40 mg Oral Daily    enoxaparin  40 mg Subcutaneous Daily    insulin aspart U-100  6 Units Subcutaneous TIDWM    insulin detemir U-100  70 Units Subcutaneous BID    pantoprazole  40 mg Oral Daily    pramipexole  0.75 mg Oral TID    traZODone  50 mg Oral QHS        Continuous Infusions:       PRN Meds:  acetaminophen, dextrose 10 % in water (D10W), dextrose 10 % in water (D10W), dextrose 10%, dextrose 10%, glucagon (human recombinant), glucose, glucose, insulin aspart U-100, melatonin, sodium chloride 0.9%       VTE prophylaxis:     Patient condition:  Stable/Fair/Guarded/ Serious/ Critical    Anticipated discharge and Disposition:         All diagnosis and differential diagnosis have been reviewed; assessment and plan has been documented; I have personally reviewed the labs and test results that are presently available; I have reviewed the patients medication list; I have reviewed the consulting providers response and recommendations. I have reviewed or attempted to review medical records based upon their  availability    All of the patient's questions have been  addressed and answered. Patient's is agreeable to the above stated plan. I will continue to monitor closely and make adjustments to medical management as needed.  _____________________________________________________________________    Nutrition Status:    Radiology:  X-Ray Chest PA And Lateral     CLINICAL:  Cough.     COMPARISON: August 1, 2021.     FINDINGS:  Cardiopericardial silhouette is within normal limits.  No  acute dense focal or segmental consolidation, congestion, pleural  effusion or pneumothorax.       IMPRESSION:     No acute cardiopulmonary process identified.       Electronically Signed By: Evan Meza MD  Date/Time Signed: 03/18/2022 10:37      Carlos Martinez MD   06/28/2022                  Gerry Lau)  Orthopaedic Surgery  [address] [place] [address]  Phone: [phone]  Fax: [phone]  Follow Up Time: 1-3 Days

## 2022-09-21 ENCOUNTER — EMERGENCY (EMERGENCY)
Facility: HOSPITAL | Age: 32
LOS: 0 days | Discharge: HOME | End: 2022-09-21
Attending: EMERGENCY MEDICINE | Admitting: EMERGENCY MEDICINE

## 2022-09-21 VITALS
DIASTOLIC BLOOD PRESSURE: 71 MMHG | OXYGEN SATURATION: 98 % | TEMPERATURE: 98 F | RESPIRATION RATE: 16 BRPM | HEART RATE: 87 BPM | SYSTOLIC BLOOD PRESSURE: 121 MMHG

## 2022-09-21 DIAGNOSIS — R19.7 DIARRHEA, UNSPECIFIED: ICD-10-CM

## 2022-09-21 DIAGNOSIS — R11.0 NAUSEA: ICD-10-CM

## 2022-09-21 DIAGNOSIS — R10.31 RIGHT LOWER QUADRANT PAIN: ICD-10-CM

## 2022-09-21 DIAGNOSIS — Z90.49 ACQUIRED ABSENCE OF OTHER SPECIFIED PARTS OF DIGESTIVE TRACT: ICD-10-CM

## 2022-09-21 LAB
ALBUMIN SERPL ELPH-MCNC: 4.7 G/DL — SIGNIFICANT CHANGE UP (ref 3.5–5.2)
ALP SERPL-CCNC: 40 U/L — SIGNIFICANT CHANGE UP (ref 30–115)
ALT FLD-CCNC: 19 U/L — SIGNIFICANT CHANGE UP (ref 0–41)
ANION GAP SERPL CALC-SCNC: 16 MMOL/L — HIGH (ref 7–14)
APPEARANCE UR: CLEAR — SIGNIFICANT CHANGE UP
AST SERPL-CCNC: 22 U/L — SIGNIFICANT CHANGE UP (ref 0–41)
BACTERIA # UR AUTO: NEGATIVE — SIGNIFICANT CHANGE UP
BASOPHILS # BLD AUTO: 0.04 K/UL — SIGNIFICANT CHANGE UP (ref 0–0.2)
BASOPHILS NFR BLD AUTO: 0.2 % — SIGNIFICANT CHANGE UP (ref 0–1)
BILIRUB SERPL-MCNC: 0.5 MG/DL — SIGNIFICANT CHANGE UP (ref 0.2–1.2)
BILIRUB UR-MCNC: NEGATIVE — SIGNIFICANT CHANGE UP
BUN SERPL-MCNC: 15 MG/DL — SIGNIFICANT CHANGE UP (ref 10–20)
CALCIUM SERPL-MCNC: 9.9 MG/DL — SIGNIFICANT CHANGE UP (ref 8.4–10.5)
CHLORIDE SERPL-SCNC: 103 MMOL/L — SIGNIFICANT CHANGE UP (ref 98–110)
CO2 SERPL-SCNC: 20 MMOL/L — SIGNIFICANT CHANGE UP (ref 17–32)
COLOR SPEC: COLORLESS — SIGNIFICANT CHANGE UP
CREAT SERPL-MCNC: 0.8 MG/DL — SIGNIFICANT CHANGE UP (ref 0.7–1.5)
DIFF PNL FLD: ABNORMAL
EGFR: 99 ML/MIN/1.73M2 — SIGNIFICANT CHANGE UP
EOSINOPHIL # BLD AUTO: 0.04 K/UL — SIGNIFICANT CHANGE UP (ref 0–0.7)
EOSINOPHIL NFR BLD AUTO: 0.2 % — SIGNIFICANT CHANGE UP (ref 0–8)
EPI CELLS # UR: 5 /HPF — SIGNIFICANT CHANGE UP (ref 0–5)
GLUCOSE SERPL-MCNC: 89 MG/DL — SIGNIFICANT CHANGE UP (ref 70–99)
GLUCOSE UR QL: NEGATIVE — SIGNIFICANT CHANGE UP
HCG SERPL QL: NEGATIVE — SIGNIFICANT CHANGE UP
HCT VFR BLD CALC: 39.9 % — SIGNIFICANT CHANGE UP (ref 37–47)
HGB BLD-MCNC: 13.9 G/DL — SIGNIFICANT CHANGE UP (ref 12–16)
HYALINE CASTS # UR AUTO: 2 /LPF — SIGNIFICANT CHANGE UP (ref 0–7)
IMM GRANULOCYTES NFR BLD AUTO: 0.4 % — HIGH (ref 0.1–0.3)
KETONES UR-MCNC: NEGATIVE — SIGNIFICANT CHANGE UP
LACTATE SERPL-SCNC: 1.4 MMOL/L — SIGNIFICANT CHANGE UP (ref 0.7–2)
LEUKOCYTE ESTERASE UR-ACNC: NEGATIVE — SIGNIFICANT CHANGE UP
LIDOCAIN IGE QN: 19 U/L — SIGNIFICANT CHANGE UP (ref 7–60)
LYMPHOCYTES # BLD AUTO: 13.3 % — LOW (ref 20.5–51.1)
LYMPHOCYTES # BLD AUTO: 2.13 K/UL — SIGNIFICANT CHANGE UP (ref 1.2–3.4)
MAGNESIUM SERPL-MCNC: 2.1 MG/DL — SIGNIFICANT CHANGE UP (ref 1.8–2.4)
MCHC RBC-ENTMCNC: 30.5 PG — SIGNIFICANT CHANGE UP (ref 27–31)
MCHC RBC-ENTMCNC: 34.8 G/DL — SIGNIFICANT CHANGE UP (ref 32–37)
MCV RBC AUTO: 87.5 FL — SIGNIFICANT CHANGE UP (ref 81–99)
MONOCYTES # BLD AUTO: 0.69 K/UL — HIGH (ref 0.1–0.6)
MONOCYTES NFR BLD AUTO: 4.3 % — SIGNIFICANT CHANGE UP (ref 1.7–9.3)
NEUTROPHILS # BLD AUTO: 13.04 K/UL — HIGH (ref 1.4–6.5)
NEUTROPHILS NFR BLD AUTO: 81.6 % — HIGH (ref 42.2–75.2)
NITRITE UR-MCNC: NEGATIVE — SIGNIFICANT CHANGE UP
NRBC # BLD: 0 /100 WBCS — SIGNIFICANT CHANGE UP (ref 0–0)
PH UR: 6 — SIGNIFICANT CHANGE UP (ref 5–8)
PLATELET # BLD AUTO: 294 K/UL — SIGNIFICANT CHANGE UP (ref 130–400)
POTASSIUM SERPL-MCNC: 4.3 MMOL/L — SIGNIFICANT CHANGE UP (ref 3.5–5)
POTASSIUM SERPL-SCNC: 4.3 MMOL/L — SIGNIFICANT CHANGE UP (ref 3.5–5)
PROT SERPL-MCNC: 7.5 G/DL — SIGNIFICANT CHANGE UP (ref 6–8)
PROT UR-MCNC: NEGATIVE — SIGNIFICANT CHANGE UP
RBC # BLD: 4.56 M/UL — SIGNIFICANT CHANGE UP (ref 4.2–5.4)
RBC # FLD: 13 % — SIGNIFICANT CHANGE UP (ref 11.5–14.5)
RBC CASTS # UR COMP ASSIST: 9 /HPF — HIGH (ref 0–4)
SODIUM SERPL-SCNC: 139 MMOL/L — SIGNIFICANT CHANGE UP (ref 135–146)
SP GR SPEC: 1.01 — LOW (ref 1.01–1.03)
UROBILINOGEN FLD QL: SIGNIFICANT CHANGE UP
WBC # BLD: 16.01 K/UL — HIGH (ref 4.8–10.8)
WBC # FLD AUTO: 16.01 K/UL — HIGH (ref 4.8–10.8)
WBC UR QL: 4 /HPF — SIGNIFICANT CHANGE UP (ref 0–5)

## 2022-09-21 PROCEDURE — 76830 TRANSVAGINAL US NON-OB: CPT | Mod: 26

## 2022-09-21 PROCEDURE — 99285 EMERGENCY DEPT VISIT HI MDM: CPT

## 2022-09-21 PROCEDURE — 74177 CT ABD & PELVIS W/CONTRAST: CPT | Mod: 26,MA

## 2022-09-21 RX ORDER — DIPHENHYDRAMINE HCL 50 MG
25 CAPSULE ORAL ONCE
Refills: 0 | Status: COMPLETED | OUTPATIENT
Start: 2022-09-21 | End: 2022-09-21

## 2022-09-21 RX ORDER — ONDANSETRON 8 MG/1
4 TABLET, FILM COATED ORAL ONCE
Refills: 0 | Status: COMPLETED | OUTPATIENT
Start: 2022-09-21 | End: 2022-09-21

## 2022-09-21 NOTE — ED PROVIDER NOTE - PATIENT PORTAL LINK FT
You can access the FollowMyHealth Patient Portal offered by [facility] by registering at the following website: [email or web address]. By joining [employer]’s FollowMyHealth portal, you will also be able to view your health information using other applications (apps) compatible with our system.

## 2022-09-21 NOTE — ED PROVIDER NOTE - NS ED ROS FT
Review of Systems:  	•	CONSTITUTIONAL - no fever, no diaphoresis, no weight change  	•	SKIN - no rash  	•	HEMATOLOGIC - no bleeding, no bruising  	•	EYES - no eye pain, no blurred vision  	•	ENT - no change in hearing, no pain  	•	RESPIRATORY - no shortness of breath, no cough  	•	CARDIAC - no chest pain, no palpitations  	•	GI - +lower abd pain, + nausea, no vomiting, + diarrhea, no constipation, no bleeding  	•	 - no dysuria, no hematuria, no flank pain, no urinary retention  	•	MUSCULOSKELETAL - no joint paint, no swelling, no redness  	•	NEUROLOGIC - no focal weakness or numbness, no headache, no paresthesias, no dizziness, no facial droop, no slurred speech, no vision changes  All other systems negative, unless specified in HPI

## 2022-09-21 NOTE — ED PROVIDER NOTE - CLINICAL SUMMARY MEDICAL DECISION MAKING FREE TEXT BOX
lázarok: received sign out from Dr. Peterson. leukocytosis to 16.  imaging and labs o/w reassuring. on exam, nontoxic, vss   rpt abd exam no sig ttp. no r/g  In my opinion, based on current evaluation and results, an acute medical or surgical emergency does not appear to be occurring at this time and I feel that the pt is stable for further outpatient work up and/or treatment.   has close [facility] f/up  Return precautions discussed.

## 2022-09-21 NOTE — ED PROVIDER NOTE - OBJECTIVE STATEMENT
33 yo f with no pmh, post alexandra, presents with c/o RLQ pain.  pt is undergoing IVF currently and had retrieval 2 weeks ago.  had US done 2 days ago and was told by [facility], no concerns.  pt says had diarrhea yesterday.  +nausea.  no vomiting.  no fever or chills.  no urinary sx.  pt admits has lower abd cramping.  no vaginal dc or bleeding

## 2022-09-21 NOTE — ED PROVIDER NOTE - NSFOLLOWUPINSTRUCTIONS_ED_ALL_ED_FT
Please return to the emergency department if you have new/changed/worsening abdominal pain, abdominal swelling, nausea, inability to tolerate food or drink, vomiting, diarrhea, constipation, urinary incontinence/retention, urinary frequency, urinary discharge, vaginal bleeding, vaginal discharge, fevers >100.4 or other symptoms that you find concerning.     Drink plenty of fluids (water, soup, gatorade, pedialyte) to stay hydrated.    Follow up with your primary care doctor or [facility] doctor within 1 week. Show them your results.

## 2022-09-21 NOTE — ED PROVIDER NOTE - PHYSICAL EXAMINATION
VITAL SIGNS: I have reviewed nursing notes and confirm.  CONSTITUTIONAL: Well-developed; well-nourished; in no acute distress.  SKIN: Skin exam is warm and dry, no acute rash.  HEAD: Normocephalic; atraumatic.  EYES: PERRL, EOM intact; conjunctiva and sclera clear.  ENT: No nasal discharge; airway clear. TMs clear.  NECK: Supple; non tender.  CARD: S1, S2 normal; no murmurs, gallops, or rubs. Regular rate and rhythm.  RESP: No wheezes, rales or rhonchi.  ABD: Normal bowel sounds; soft; non-distended; mildly ttp RLQ, no rebound, no guarding  PSYCH: Cooperative, appropriate.

## 2022-09-22 LAB
CULTURE RESULTS: SIGNIFICANT CHANGE UP
SPECIMEN SOURCE: SIGNIFICANT CHANGE UP

## 2022-10-27 NOTE — ED PROVIDER NOTE - PHYSICAL EXAMINATION
Physical Therapy Discharge Summary    Reason for therapy discharge:    Discharged to transitional care facility.    Progress towards therapy goal(s). See goals on Care Plan in Psychiatric electronic health record for goal details.  Goals partially met.  Barriers to achieving goals:   limited tolerance for therapy and discharge from facility.    Therapy recommendation(s):    Continued therapy is recommended.  Rationale/Recommendations:  Continue at facility.       VITAL SIGNS: I have reviewed nursing notes and confirm.  CONSTITUTIONAL: Well-developed; well-nourished; in no acute distress.  SKIN: Skin exam is warm and dry, no acute rash.  HEAD: Normocephalic; atraumatic.  EYES: Conjunctiva and sclera clear.  EXT: Normal ROM. (+) TTP to right first toe with mild ecchymosis and swelling. FROM. Sensation intact. CR < 2 seconds. DP 2+.   NEURO: Alert, oriented. Grossly unremarkable. No focal deficits.

## 2022-11-23 ENCOUNTER — EMERGENCY (EMERGENCY)
Facility: HOSPITAL | Age: 32
LOS: 0 days | Discharge: HOME | End: 2022-11-23

## 2022-11-23 PROCEDURE — L9992: CPT

## 2022-12-17 ENCOUNTER — EMERGENCY (EMERGENCY)
Facility: HOSPITAL | Age: 32
LOS: 0 days | Discharge: HOME | End: 2022-12-17
Attending: EMERGENCY MEDICINE | Admitting: EMERGENCY MEDICINE

## 2022-12-17 VITALS
SYSTOLIC BLOOD PRESSURE: 121 MMHG | TEMPERATURE: 98 F | RESPIRATION RATE: 20 BRPM | HEART RATE: 90 BPM | DIASTOLIC BLOOD PRESSURE: 80 MMHG | OXYGEN SATURATION: 99 %

## 2022-12-17 DIAGNOSIS — J02.9 ACUTE PHARYNGITIS, UNSPECIFIED: ICD-10-CM

## 2022-12-17 DIAGNOSIS — J06.9 ACUTE UPPER RESPIRATORY INFECTION, UNSPECIFIED: ICD-10-CM

## 2022-12-17 DIAGNOSIS — Z20.822 CONTACT WITH AND (SUSPECTED) EXPOSURE TO COVID-19: ICD-10-CM

## 2022-12-17 DIAGNOSIS — R05.9 COUGH, UNSPECIFIED: ICD-10-CM

## 2022-12-17 DIAGNOSIS — R51.9 HEADACHE, UNSPECIFIED: ICD-10-CM

## 2022-12-17 LAB
RAPID RVP RESULT: SIGNIFICANT CHANGE UP
SARS-COV-2 RNA SPEC QL NAA+PROBE: SIGNIFICANT CHANGE UP

## 2022-12-17 PROCEDURE — 99283 EMERGENCY DEPT VISIT LOW MDM: CPT

## 2022-12-17 NOTE — ED ADULT NURSE NOTE - NSIMPLEMENTINTERV_GEN_ALL_ED
Implemented All Universal Safety Interventions:  [place] to call system. Call bell, personal items and telephone within reach. Instruct patient to call for assistance. Room bathroom lighting operational. Non-slip footwear when patient is off stretcher. Physically safe environment: no spills, clutter or unnecessary equipment. Stretcher in lowest position, wheels locked, appropriate side rails in place.

## 2022-12-17 NOTE — ED PROVIDER NOTE - OBJECTIVE STATEMENT
Patient presenting for evaluation of URI symptoms for the past 3 days.  Sore throat cough.  No fevers chills.  Mild associated headache.

## 2022-12-17 NOTE — ED PROVIDER NOTE - PHYSICAL EXAMINATION
Constitutional: Well appearing. No acute distress. Non toxic.   Eyes: PERRLA. Extraocular movements intact, no entrapment. Conjunctiva normal.      CV: RRR no murmurs, rubs, or gallops. +S1S2.   Pulm: Clear to auscultation bilaterally. Normal work of breathing.     Ext: Warm and well perfused x4, moving all extremities  Psy: Cooperative, appropriate.   Skin: Warm, dry, no rash  Neuro: nonfocal

## 2022-12-17 NOTE — ED ADULT NURSE NOTE - CHIEF COMPLAINT
I did not personally see the patient.  I reviewed and agree with the assessment and plan as documented in this note.     Hilda Davis PsyD, LP       The patient is a 32y Female complaining of flu-like symptoms.

## 2022-12-17 NOTE — ED PROVIDER NOTE - PATIENT PORTAL LINK FT
You can access the FollowMyHealth Patient Portal offered by [facility] by registering at the following website: [email or web address]. By joining [employer]’s FollowMyHealth portal, you will also be able to view your health information using other applications (apps) compatible with our system.

## 2022-12-17 NOTE — ED PROVIDER NOTE - NS ED ROS FT
Constitutional:  See HPI.   Eyes:  No visual changes, eye pain or discharge.  ENMT:  No hearing changes, pain, discharge or infections. No neck pain or stiffness.  Cardiac:  No chest pain, SOB or edema. No chest pain with exertion.     GI:  No nausea, vomiting, diarrhea, abdominal pain.  :  No dysuria, frequency, hematuria  MS:  No joint pain or back pain.  Neuro:  No LOC.    Skin:  No skin rash.  Except as in HPI, all other review of systems is negative

## 2022-12-17 NOTE — ED PROVIDER NOTE - CLINICAL SUMMARY MEDICAL DECISION MAKING FREE TEXT BOX
Patient presenting for evaluation of URI symptoms for the past 3 days.  Sore throat cough.  No fevers chills.  Mild associated headache. Comfortable with discharge and follow-up outpatient, strict return precautions given. Endorses understanding of all of this and aware that they can return at any time for new or concerning symptoms. No further questions or concerns at this time

## 2022-12-23 ENCOUNTER — EMERGENCY (EMERGENCY)
Facility: HOSPITAL | Age: 32
LOS: 0 days | Discharge: HOME | End: 2022-12-23
Attending: EMERGENCY MEDICINE | Admitting: EMERGENCY MEDICINE

## 2022-12-23 VITALS
HEART RATE: 78 BPM | WEIGHT: 214.95 LBS | DIASTOLIC BLOOD PRESSURE: 85 MMHG | SYSTOLIC BLOOD PRESSURE: 141 MMHG | OXYGEN SATURATION: 100 % | HEIGHT: 64 IN | RESPIRATION RATE: 20 BRPM | TEMPERATURE: 97 F

## 2022-12-23 DIAGNOSIS — O20.9 HEMORRHAGE IN EARLY PREGNANCY, UNSPECIFIED: ICD-10-CM

## 2022-12-23 DIAGNOSIS — Z90.49 ACQUIRED ABSENCE OF OTHER SPECIFIED PARTS OF DIGESTIVE TRACT: ICD-10-CM

## 2022-12-23 DIAGNOSIS — Z3A.08 8 WEEKS GESTATION OF PREGNANCY: ICD-10-CM

## 2022-12-23 LAB
ALBUMIN SERPL ELPH-MCNC: 3.5 G/DL — SIGNIFICANT CHANGE UP (ref 3.5–5.2)
ALP SERPL-CCNC: 30 U/L — SIGNIFICANT CHANGE UP (ref 30–115)
ALT FLD-CCNC: 24 U/L — SIGNIFICANT CHANGE UP (ref 0–41)
ANION GAP SERPL CALC-SCNC: 10 MMOL/L — SIGNIFICANT CHANGE UP (ref 7–14)
APPEARANCE UR: CLEAR — SIGNIFICANT CHANGE UP
AST SERPL-CCNC: 19 U/L — SIGNIFICANT CHANGE UP (ref 0–41)
BACTERIA # UR AUTO: ABNORMAL
BASOPHILS # BLD AUTO: 0.01 K/UL — SIGNIFICANT CHANGE UP (ref 0–0.2)
BASOPHILS NFR BLD AUTO: 0.2 % — SIGNIFICANT CHANGE UP (ref 0–1)
BILIRUB SERPL-MCNC: 0.3 MG/DL — SIGNIFICANT CHANGE UP (ref 0.2–1.2)
BILIRUB UR-MCNC: NEGATIVE — SIGNIFICANT CHANGE UP
BLD GP AB SCN SERPL QL: SIGNIFICANT CHANGE UP
BUN SERPL-MCNC: 9 MG/DL — LOW (ref 10–20)
CALCIUM SERPL-MCNC: 9.4 MG/DL — SIGNIFICANT CHANGE UP (ref 8.4–10.5)
CHLORIDE SERPL-SCNC: 104 MMOL/L — SIGNIFICANT CHANGE UP (ref 98–110)
CO2 SERPL-SCNC: 25 MMOL/L — SIGNIFICANT CHANGE UP (ref 17–32)
COLOR SPEC: YELLOW — SIGNIFICANT CHANGE UP
CREAT SERPL-MCNC: 0.7 MG/DL — SIGNIFICANT CHANGE UP (ref 0.7–1.5)
DIFF PNL FLD: ABNORMAL
EGFR: 116 ML/MIN/1.73M2 — SIGNIFICANT CHANGE UP
EOSINOPHIL # BLD AUTO: 0.04 K/UL — SIGNIFICANT CHANGE UP (ref 0–0.7)
EOSINOPHIL NFR BLD AUTO: 0.7 % — SIGNIFICANT CHANGE UP (ref 0–8)
EPI CELLS # UR: ABNORMAL /HPF
GLUCOSE SERPL-MCNC: 88 MG/DL — SIGNIFICANT CHANGE UP (ref 70–99)
GLUCOSE UR QL: NEGATIVE MG/DL — SIGNIFICANT CHANGE UP
HCG SERPL-ACNC: HIGH MIU/ML
HCT VFR BLD CALC: 37.4 % — SIGNIFICANT CHANGE UP (ref 37–47)
HGB BLD-MCNC: 12.8 G/DL — SIGNIFICANT CHANGE UP (ref 12–16)
IMM GRANULOCYTES NFR BLD AUTO: 0.3 % — SIGNIFICANT CHANGE UP (ref 0.1–0.3)
KETONES UR-MCNC: NEGATIVE — SIGNIFICANT CHANGE UP
LEUKOCYTE ESTERASE UR-ACNC: NEGATIVE — SIGNIFICANT CHANGE UP
LIDOCAIN IGE QN: 17 U/L — SIGNIFICANT CHANGE UP (ref 7–60)
LYMPHOCYTES # BLD AUTO: 1.85 K/UL — SIGNIFICANT CHANGE UP (ref 1.2–3.4)
LYMPHOCYTES # BLD AUTO: 30.3 % — SIGNIFICANT CHANGE UP (ref 20.5–51.1)
MCHC RBC-ENTMCNC: 29.8 PG — SIGNIFICANT CHANGE UP (ref 27–31)
MCHC RBC-ENTMCNC: 34.2 G/DL — SIGNIFICANT CHANGE UP (ref 32–37)
MCV RBC AUTO: 87 FL — SIGNIFICANT CHANGE UP (ref 81–99)
MONOCYTES # BLD AUTO: 0.28 K/UL — SIGNIFICANT CHANGE UP (ref 0.1–0.6)
MONOCYTES NFR BLD AUTO: 4.6 % — SIGNIFICANT CHANGE UP (ref 1.7–9.3)
NEUTROPHILS # BLD AUTO: 3.9 K/UL — SIGNIFICANT CHANGE UP (ref 1.4–6.5)
NEUTROPHILS NFR BLD AUTO: 63.9 % — SIGNIFICANT CHANGE UP (ref 42.2–75.2)
NITRITE UR-MCNC: NEGATIVE — SIGNIFICANT CHANGE UP
NRBC # BLD: 0 /100 WBCS — SIGNIFICANT CHANGE UP (ref 0–0)
PH UR: 6.5 — SIGNIFICANT CHANGE UP (ref 5–8)
PLATELET # BLD AUTO: 240 K/UL — SIGNIFICANT CHANGE UP (ref 130–400)
POTASSIUM SERPL-MCNC: 4 MMOL/L — SIGNIFICANT CHANGE UP (ref 3.5–5)
POTASSIUM SERPL-SCNC: 4 MMOL/L — SIGNIFICANT CHANGE UP (ref 3.5–5)
PROT SERPL-MCNC: 6 G/DL — SIGNIFICANT CHANGE UP (ref 6–8)
PROT UR-MCNC: NEGATIVE MG/DL — SIGNIFICANT CHANGE UP
RBC # BLD: 4.3 M/UL — SIGNIFICANT CHANGE UP (ref 4.2–5.4)
RBC # FLD: 12.4 % — SIGNIFICANT CHANGE UP (ref 11.5–14.5)
RBC CASTS # UR COMP ASSIST: SIGNIFICANT CHANGE UP /HPF
SODIUM SERPL-SCNC: 139 MMOL/L — SIGNIFICANT CHANGE UP (ref 135–146)
SP GR SPEC: 1.01 — SIGNIFICANT CHANGE UP (ref 1.01–1.03)
UROBILINOGEN FLD QL: 0.2 MG/DL — SIGNIFICANT CHANGE UP
WBC # BLD: 6.1 K/UL — SIGNIFICANT CHANGE UP (ref 4.8–10.8)
WBC # FLD AUTO: 6.1 K/UL — SIGNIFICANT CHANGE UP (ref 4.8–10.8)
WBC UR QL: SIGNIFICANT CHANGE UP /HPF

## 2022-12-23 PROCEDURE — 99284 EMERGENCY DEPT VISIT MOD MDM: CPT

## 2022-12-23 RX ORDER — SODIUM CHLORIDE 9 MG/ML
1000 INJECTION, SOLUTION INTRAVENOUS ONCE
Refills: 0 | Status: COMPLETED | OUTPATIENT
Start: 2022-12-23 | End: 2022-12-23

## 2022-12-23 RX ADMIN — SODIUM CHLORIDE 1000 MILLILITER(S): 9 INJECTION, SOLUTION INTRAVENOUS at 14:59

## 2022-12-23 NOTE — ED PROVIDER NOTE - OBJECTIVE STATEMENT
Patient is a 32-year-old female history of cholecystectomy 2 years ago currently 8 weeks gestation here for evaluation of vaginal spotting that began this morning.  Patient was evaluated by her OB had pelvic exam, pelvic ultrasound and was sent for RhoGAM.  Patient has not had bleeding since, no palpitations, chest pain, lightheadedness, dysuria, hematuria

## 2022-12-23 NOTE — ED PROVIDER NOTE - NSFOLLOWUPINSTRUCTIONS_ED_ALL_ED_FT
PLEASE FOLLOW UP WITH YOUR OB GYN IN 1-3 DAYS.   Vaginal Bleeding in Pregnancy: Threatened     A threatened miscarriage occurs when you have vaginal bleeding during your first 20 weeks of pregnancy but the pregnancy has not ended. If you have vaginal bleeding during this time, your health care provider will do tests to make sure you are still pregnant. If the tests show you are still pregnant and the developing baby (fetus) inside your womb (uterus) is still growing, your condition is considered a threatened miscarriage. A threatened miscarriage does not mean your pregnancy will end, but it does increase the risk of losing your pregnancy.    SEEK IMMEDIATE MEDICAL CARE IF YOU HAVE THE FOLLOWING SYMPTOMS: heavy vaginal bleeding, severe low back or abdominal cramps, fever/chills, or lightheadedness/dizziness.

## 2022-12-23 NOTE — ED PROVIDER NOTE - PROGRESS NOTE DETAILS
ED Attending CLARITA Dorantes  Patient is a good candidate to attempt outpatient management. Supportive care and home care discussed in detail. Patient aware they may have to return for re-evaluation and possible admission if outpatient treatment fails. Strict return precautions discussed.

## 2022-12-23 NOTE — ED PROVIDER NOTE - CLINICAL SUMMARY MEDICAL DECISION MAKING FREE TEXT BOX
32-year-old female  1 para 0 approximately 8.5 weeks pregnant via IVF presents from OB/GYN office for type and screen and RhoGAM to be given as patient reports she is A - .  Patient reports she had 1 episode of mild vaginal bleeding this morning went to go see her OB/GYN Dr. Oro who did a pelvic exam and ultrasound at the office, this is her third ultrasound, and told her she just needed to get a type and screen and RhoGAM so patient came to the emergency department to get this.  Patient denies any current vaginal bleeding or any other symptoms.  Not on anticoagulation.  denies fever, chills, n/v, cp, sob, pleuritic chest pain, palpitations, diaphoresis, cough, abd pain, diarrhea, constipation, melena/brbpr, urinary symptoms, back/ flank pain, vaginal discharge/odor, sick contacts, recent travel or rash.   Patient blood type A-, results reviewed, bleeding precautions discussed, given RhoGAM.  Aware signs and symptoms to return for.  will follow-up with her OB/GYN.  Patient is a good candidate to attempt outpatient management. Supportive care and home care discussed in detail. Patient aware they may have to return for re-evaluation and possible admission if outpatient treatment fails. Strict return precautions discussed.

## 2022-12-23 NOTE — ED PROVIDER NOTE - NS ED ATTENDING STATEMENT MOD
This was a shared visit with the [facility]. I reviewed and verified the documentation and independently performed the documented:

## 2022-12-23 NOTE — ED PROVIDER NOTE - PATIENT PORTAL LINK FT
You can access the FollowMyHealth Patient Portal offered by [facility] by registering at the following website: [email or web address]. By joining [employer]’s FollowMyHealth portal, you will also be able to view your health information using other applications (apps) compatible with our system.

## 2022-12-23 NOTE — ED PROVIDER NOTE - NS ED ROS FT
GI:  No nausea, vomiting, diarrhea or abdominal pain.  : Vaginal spotting   MS:  No myalgia, muscle weakness, joint pain or back pain.  Neuro:  No headache or weakness.  No LOC.  Skin:  No skin rash.   Endocrine: No history of thyroid disease or diabetes.  Except as documented in the HPI,  all other systems are negative.

## 2022-12-23 NOTE — ED PROVIDER NOTE - PHYSICAL EXAMINATION
VITAL SIGNS: I have reviewed nursing notes and confirm.  CONSTITUTIONAL: Well-developed; well-nourished; in no acute distress.   SKIN: skin exam is warm and dry, no acute rash.    HEAD: Normocephalic; atraumatic.  EYES: conjunctiva and sclera clear.  ABD: Normal bowel sounds; soft; non-distended; non-tender  EXT: Normal ROM.  No clubbing, cyanosis or edema.   NEURO: Alert, oriented, grossly unremarkable

## 2022-12-23 NOTE — ED ADULT TRIAGE NOTE - CHIEF COMPLAINT QUOTE
patient 8 weeks pregnant, had small amount of vaginal bleeding today. sent to ED by MD Oro for Rhogam and blood work

## 2022-12-23 NOTE — ED PROVIDER NOTE - CARE PROVIDER_API CALL
Enoch Mason)  Obstetrics and Gynecology; Reproductive EndoInfertility  [address] [place] [address]  Phone: [phone]  Fax: [phone]  Follow Up Time:

## 2022-12-23 NOTE — ED ADULT TRIAGE NOTE - INTERNATIONAL TRAVEL
Pt has hx of C.Diff infection 8/2021. At presentation, was experiencing watery, voluminous, dark-colored diarrhea ~4x/daily. C.Diff toxin neg on admission, potentially false negative as pt had diarrhea prior to admission. Pt still experiencing diarrhea, repeat C.Diff +    - c/w Vancomycin to 500mg po q6h  - c/w Flagyl 500mg IV q8h  - isolation/contact precautions No

## 2022-12-23 NOTE — ED PROVIDER NOTE - DISPOSITION TYPE
DISPLAY PLAN FREE TEXT
DISCHARGE

## 2022-12-23 NOTE — ED PROVIDER NOTE - ATTENDING APP SHARED VISIT CONTRIBUTION OF CARE
32-year-old female  1 para 0 approximately 8.5 weeks pregnant via IVF presents from OB/GYN office for type and screen and RhoGAM to be given as patient reports she is A - .  Patient reports she had 1 episode of mild vaginal bleeding this morning went to go see her OB/GYN Dr. Oro who did a pelvic exam and ultrasound at the office, this is her third ultrasound, and told her she just needed to get a type and screen and RhoGAM so patient came to the emergency department to get this.  Patient denies any current vaginal bleeding or any other symptoms.  Not on anticoagulation.  denies fever, chills, n/v, cp, sob, pleuritic chest pain, palpitations, diaphoresis, cough, abd pain, diarrhea, constipation, melena/brbpr, urinary symptoms, back/ flank pain, vaginal discharge/odor, sick contacts, recent travel or rash.     on exam: Non toxic appearing pt sitting on stretcher in nad, no rash, mmm, RRR, radial pulses 2/4 b/l, no jvd, ctabl w/ breath sounds present b/l, no wheezing or crackles,  no accessory muscle use, no tachypnea, no stridor, bs present throughout all 4 quadrants, abd soft, nd, nt, no rebound tenderness or guarding, no cvat,  FROM of ext, no edema, no calf pain/swelling/erythema, AAOx3. No focal deficits.     Plan: labs, urine, Rhogam, reassess. 32-year-old female  1 para 0 approximately 8.5 weeks pregnant via IVF presents from OB/GYN office for type and screen and RhoGAM to be given as patient reports she is A - .  Patient reports she had 1 episode of mild vaginal bleeding this morning went to go see her OB/GYN Dr. Oro who did a pelvic exam and ultrasound at the office, this is her third ultrasound, and told her she just needed to get a type and screen and RhoGAM so patient came to the emergency department to get this.  Patient denies any current vaginal bleeding or any other symptoms.  Not on anticoagulation.  denies fever, chills, n/v, cp, sob, pleuritic chest pain, palpitations, diaphoresis, cough, abd pain, diarrhea, constipation, melena/brbpr, urinary symptoms, back/ flank pain, vaginal discharge/odor, sick contacts, recent travel or rash. lmp oct 28.    on exam: Non toxic appearing pt sitting on stretcher in nad, no rash, mmm, RRR, radial pulses 2/4 b/l, no jvd, ctabl w/ breath sounds present b/l, no wheezing or crackles,  no accessory muscle use, no tachypnea, no stridor, bs present throughout all 4 quadrants, abd soft, nd, nt, no rebound tenderness or guarding, no cvat,  FROM of ext, no edema, no calf pain/swelling/erythema, AAOx3. No focal deficits.     Plan: labs, urine, Rhogam, reassess.

## 2022-12-25 LAB
CULTURE RESULTS: SIGNIFICANT CHANGE UP
SPECIMEN SOURCE: SIGNIFICANT CHANGE UP

## 2023-09-12 NOTE — PATIENT PROFILE ADULT - NSPROGENPREVTRANSF_GEN_A_NUR
Multi-D Rounds/Checklist (leapfrog):  Lines: can any be removed?: None    Urinary Catheter 09/12/23 Perera-Temperature (Active)      DVT Prophylaxis: Ordered  Vent: N/A  Nutrition Ordered/appropriate: Per Primary Team  Can antibiotics or other drugs be stopped? N/A Yes/No  Inpat Anti-Infectives (From admission, onward)      None          Consults needed: None  A: Is pain control adequate? (has PRNs? Stop drip?) Yes  B: Sedation break and SBT? N/A  C: Is sedation choice appropriate? N/A  D: Delirium/CAM-ICU? Yes  E: Mobility goals/appropriateness? N/A  F: Family update and plan? Daughter is surrogate decision maker and is being updated daily by primary attending and nursing staff.     ARNALDO Pathak no

## 2024-10-06 NOTE — ED ADULT TRIAGE NOTE - SOURCE OF INFORMATION
[facility]/Population Health Chart Review & Patient Outreach Details For Medication Adherence Project    Reason for Outreach Encounter: 3rd Party payor non-compliance report ([employer], [employer], [employer], etc)  2.  Patient Outreach Method: Reviewed patient chart   3.   Medication in question:   Diabetes Medications               JARDIANCE 10 mg tablet Take 1 tablet by mouth once daily    TRULICITY 1.5 mg/0.5 mL pen injector INJECT 1 SYRINGE SUBCUTANEOUSLY ONCE A WEEK              Hypertension Medications               hydroCHLOROthiazide (HYDRODIURIL) 25 MG tablet Take 1 tablet (25 mg total) by mouth once daily.    losartan (COZAAR) 25 MG tablet Take 1 tablet by mouth once daily              Hyperlipidemia Medications               atorvastatin (LIPITOR) 40 MG tablet Take 1 tablet by mouth once daily                LAST FILLED:   Jardiance 9/21/24 for 30 day supply  Losartan 8/27/24 for 90 day supply  Atorvastatin 8/2/24 for 90 day supply    4.  Reviewed and or Updates Made To: Patient Chart  5. Outreach Outcomes and/or actions taken: Patient filled medication and is on track to be adherent  Additional Notes:        Patient

## 2025-03-06 NOTE — PRE-OP CHECKLIST - ISOLATION PRECAUTIONS
H&P reviewed. After examining the patient I find no changes in the patients condition since the H&P had been written.    Vitals:    03/06/25 0901   BP: (!) 113/62   Pulse: 61   Resp: 18   Temp: 97.8 °F (36.6 °C)   SpO2: 100%     
none

## 2025-03-20 ENCOUNTER — NON-APPOINTMENT (OUTPATIENT)
Age: 35
End: 2025-03-20

## 2025-03-20 DIAGNOSIS — Z98.890 OTHER SPECIFIED POSTPROCEDURAL STATES: ICD-10-CM

## 2025-03-20 DIAGNOSIS — Z82.49 FAMILY HISTORY OF ISCHEMIC HEART DISEASE AND OTHER DISEASES OF THE CIRCULATORY SYSTEM: ICD-10-CM

## 2025-03-20 DIAGNOSIS — Z87.19 PERSONAL HISTORY OF OTHER DISEASES OF THE DIGESTIVE SYSTEM: ICD-10-CM

## 2025-03-20 DIAGNOSIS — L29.2 PRURITUS VULVAE: ICD-10-CM

## 2025-03-20 DIAGNOSIS — Z80.8 FAMILY HISTORY OF MALIGNANT NEOPLASM OF OTHER ORGANS OR SYSTEMS: ICD-10-CM

## 2025-03-20 DIAGNOSIS — R73.09 OTHER ABNORMAL GLUCOSE: ICD-10-CM

## 2025-03-20 DIAGNOSIS — Z78.9 OTHER SPECIFIED HEALTH STATUS: ICD-10-CM

## 2025-03-20 RX ORDER — LEVOTHYROXINE SODIUM 0.1 MG/1
100 TABLET ORAL
Refills: 0 | Status: ACTIVE | COMMUNITY

## 2025-03-25 ENCOUNTER — APPOINTMENT (OUTPATIENT)
Age: 35
End: 2025-03-25
Payer: COMMERCIAL

## 2025-03-25 VITALS
WEIGHT: 235 LBS | HEIGHT: 65 IN | BODY MASS INDEX: 39.15 KG/M2 | SYSTOLIC BLOOD PRESSURE: 130 MMHG | DIASTOLIC BLOOD PRESSURE: 61 MMHG

## 2025-03-25 DIAGNOSIS — E03.9 HYPOTHYROIDISM, UNSPECIFIED: ICD-10-CM

## 2025-03-25 DIAGNOSIS — E04.1 NONTOXIC SINGLE THYROID NODULE: ICD-10-CM

## 2025-03-25 DIAGNOSIS — Z34.90 ENCOUNTER FOR SUPERVISION OF NORMAL PREGNANCY, UNSPECIFIED, UNSPECIFIED TRIMESTER: ICD-10-CM

## 2025-03-25 DIAGNOSIS — Z11.3 ENCOUNTER FOR SCREENING FOR INFECTIONS WITH A PREDOMINANTLY SEXUAL MODE OF TRANSMISSION: ICD-10-CM

## 2025-03-25 PROCEDURE — 76817 TRANSVAGINAL US OBSTETRIC: CPT

## 2025-03-25 PROCEDURE — 0500F INITIAL PRENATAL CARE VISIT: CPT

## 2025-03-27 LAB
BV BACTERIA RRNA VAG QL NAA+PROBE: NOT DETECTED
C GLABRATA RNA VAG QL NAA+PROBE: NOT DETECTED
C TRACH RRNA SPEC QL NAA+PROBE: NOT DETECTED
CANDIDA RRNA VAG QL PROBE: NOT DETECTED
N GONORRHOEA RRNA SPEC QL NAA+PROBE: NOT DETECTED
T VAGINALIS RRNA SPEC QL NAA+PROBE: NOT DETECTED

## 2025-04-16 ENCOUNTER — TRANSCRIPTION ENCOUNTER (OUTPATIENT)
Age: 35
End: 2025-04-16

## 2025-04-16 ENCOUNTER — APPOINTMENT (OUTPATIENT)
Age: 35
End: 2025-04-16
Payer: COMMERCIAL

## 2025-04-16 VITALS
SYSTOLIC BLOOD PRESSURE: 100 MMHG | HEIGHT: 65 IN | BODY MASS INDEX: 38.82 KG/M2 | HEART RATE: 74 BPM | WEIGHT: 233 LBS | DIASTOLIC BLOOD PRESSURE: 56 MMHG

## 2025-04-16 PROCEDURE — 99213 OFFICE O/P EST LOW 20 MIN: CPT

## 2025-04-16 PROCEDURE — 76815 OB US LIMITED FETUS(S): CPT

## 2025-04-17 ENCOUNTER — APPOINTMENT (OUTPATIENT)
Dept: MATERNAL FETAL MEDICINE | Facility: CLINIC | Age: 35
End: 2025-04-17
Payer: COMMERCIAL

## 2025-04-17 ENCOUNTER — APPOINTMENT (OUTPATIENT)
Dept: ANTEPARTUM | Facility: CLINIC | Age: 35
End: 2025-04-17
Payer: COMMERCIAL

## 2025-04-17 ENCOUNTER — ASOB RESULT (OUTPATIENT)
Age: 35
End: 2025-04-17

## 2025-04-17 VITALS
WEIGHT: 230 LBS | HEART RATE: 88 BPM | SYSTOLIC BLOOD PRESSURE: 128 MMHG | BODY MASS INDEX: 38.32 KG/M2 | HEIGHT: 65 IN | DIASTOLIC BLOOD PRESSURE: 88 MMHG

## 2025-04-17 DIAGNOSIS — O20.0 THREATENED ABORTION: ICD-10-CM

## 2025-04-17 DIAGNOSIS — O09.819 SUPERVISION OF PREGNANCY RESULTING FROM ASSISTED REPRODUCTIVE TECHNOLOGY, UNSPECIFIED TRIMESTER: ICD-10-CM

## 2025-04-17 DIAGNOSIS — Z3A.12 12 WEEKS GESTATION OF PREGNANCY: ICD-10-CM

## 2025-04-17 DIAGNOSIS — E04.1 NONTOXIC SINGLE THYROID NODULE: ICD-10-CM

## 2025-04-17 DIAGNOSIS — Z34.90 ENCOUNTER FOR SUPERVISION OF NORMAL PREGNANCY, UNSPECIFIED, UNSPECIFIED TRIMESTER: ICD-10-CM

## 2025-04-17 DIAGNOSIS — E03.9 HYPOTHYROIDISM, UNSPECIFIED: ICD-10-CM

## 2025-04-17 DIAGNOSIS — Z11.3 ENCOUNTER FOR SCREENING FOR INFECTIONS WITH A PREDOMINANTLY SEXUAL MODE OF TRANSMISSION: ICD-10-CM

## 2025-04-17 PROCEDURE — 76813 OB US NUCHAL MEAS 1 GEST: CPT

## 2025-04-17 PROCEDURE — 36415 COLL VENOUS BLD VENIPUNCTURE: CPT

## 2025-04-17 PROCEDURE — 99215 OFFICE O/P EST HI 40 MIN: CPT | Mod: 25

## 2025-04-17 RX ORDER — ASPIRIN 81 MG/1
81 TABLET, CHEWABLE ORAL DAILY
Qty: 60 | Refills: 2 | Status: ACTIVE | COMMUNITY
Start: 2025-04-17

## 2025-04-17 RX ORDER — PNV NO.95/FERROUS FUM/FOLIC AC 28MG-0.8MG
TABLET ORAL DAILY
Qty: 30 | Refills: 6 | Status: ACTIVE | COMMUNITY
Start: 2025-04-17

## 2025-04-17 RX ORDER — METFORMIN HYDROCHLORIDE 500 MG/1
500 TABLET, COATED ORAL
Refills: 0 | Status: ACTIVE | COMMUNITY

## 2025-04-18 ENCOUNTER — NON-APPOINTMENT (OUTPATIENT)
Age: 35
End: 2025-04-18

## 2025-04-22 LAB
1ST TRIMESTER SCN+NT PATIENT-IMP: NORMAL
AFP MOM SERPL: 2.2
AFP PERCENTILE: 97
AFP SERPL-MCNC: 24.89
AGE AT DELIVERY: 37.5
B-HCG FREE MOM PRCTL SERPL: 78.7
B-HCG FREE MOM SERPL: 1.63
B-HCG FREE SERPL-MCNC: 39.98 NG/ML
BEFORE SCREENING RISK TRISOMY 18/13: NORMAL
CHORION TYPE: NORMAL
CURRENT SMOKER: NO
DIABETES STATUS PATIENT: NO
FET CRL US.MEAS: 63.9 MM
FET NASAL BN: PRESENT
FET NUCHAL FOLD MOM THICKNESS US.MEAS: 1.63
FET TS 21 RISK FROM MAT AGE: NORMAL
GA: NORMAL
GA: NORMAL
HX OF TRISOMY 21 QL: NO
INHIBIN A ADJ MOM SERPL: 1.53
INHIBIN A SERPL-MCNC: 232 PG/ML
INHIBIN PERCENTILE: 80
MATERNAL RISK FACTORS: NORMAL
MULTIPLE PREGNANCY: NORMAL
NUCHAL  TRANSLUCENCY  MOM: 1.01
PAPP-A ADJ MOM SERPL: 1.32
PAPP-A MOM PRCTL SERPL: 69.6
PAPP-A SERPL-ACNC: 2589 MU/L
PIGF SER-MCNC: 28.57 PG/ML
PLGF MOM: 0.81
PLGF PERCENTILE: 29.6
RECOM F/U: NO
SONOGRAPHER NAME: NORMAL
TEST PERFORMANCE INFO SPEC: NORMAL
TRISOMY 18+13 RISK FETUS: NORMAL
TS 21 RISK CTO FETUS: NORMAL
TS 21 RISK FETUS: NORMAL

## 2025-04-23 ENCOUNTER — NON-APPOINTMENT (OUTPATIENT)
Age: 35
End: 2025-04-23

## 2025-04-25 ENCOUNTER — APPOINTMENT (OUTPATIENT)
Age: 35
End: 2025-04-25
Payer: COMMERCIAL

## 2025-04-25 VITALS
DIASTOLIC BLOOD PRESSURE: 78 MMHG | BODY MASS INDEX: 38.32 KG/M2 | WEIGHT: 230 LBS | SYSTOLIC BLOOD PRESSURE: 112 MMHG | HEART RATE: 103 BPM | HEIGHT: 65 IN

## 2025-04-25 DIAGNOSIS — O23.40 UNSPECIFIED INFECTION OF URINARY TRACT IN PREGNANCY, UNSPECIFIED TRIMESTER: ICD-10-CM

## 2025-04-25 PROCEDURE — 0500F INITIAL PRENATAL CARE VISIT: CPT

## 2025-04-25 RX ORDER — CEPHALEXIN 500 MG/1
500 CAPSULE ORAL
Qty: 14 | Refills: 0 | Status: ACTIVE | COMMUNITY
Start: 2025-04-25 | End: 1900-01-01

## 2025-04-29 ENCOUNTER — OUTPATIENT (OUTPATIENT)
Dept: OUTPATIENT SERVICES | Facility: HOSPITAL | Age: 35
LOS: 1 days | End: 2025-04-29
Payer: COMMERCIAL

## 2025-04-29 ENCOUNTER — RESULT REVIEW (OUTPATIENT)
Age: 35
End: 2025-04-29

## 2025-04-29 DIAGNOSIS — Z00.8 ENCOUNTER FOR OTHER GENERAL EXAMINATION: ICD-10-CM

## 2025-04-29 DIAGNOSIS — E04.1 NONTOXIC SINGLE THYROID NODULE: ICD-10-CM

## 2025-04-29 PROCEDURE — 76536 US EXAM OF HEAD AND NECK: CPT | Mod: 26

## 2025-04-29 PROCEDURE — 76536 US EXAM OF HEAD AND NECK: CPT

## 2025-04-30 DIAGNOSIS — E04.1 NONTOXIC SINGLE THYROID NODULE: ICD-10-CM

## 2025-05-20 ENCOUNTER — APPOINTMENT (OUTPATIENT)
Age: 35
End: 2025-05-20
Payer: COMMERCIAL

## 2025-05-20 VITALS
DIASTOLIC BLOOD PRESSURE: 77 MMHG | HEIGHT: 65 IN | SYSTOLIC BLOOD PRESSURE: 107 MMHG | WEIGHT: 233 LBS | BODY MASS INDEX: 38.82 KG/M2 | HEART RATE: 85 BPM

## 2025-05-20 DIAGNOSIS — Z78.9 OTHER SPECIFIED HEALTH STATUS: ICD-10-CM

## 2025-05-20 DIAGNOSIS — O99.810 ABNORMAL GLUCOSE COMPLICATING PREGNANCY: ICD-10-CM

## 2025-05-20 PROCEDURE — 0502F SUBSEQUENT PRENATAL CARE: CPT

## 2025-05-23 LAB — BACTERIA UR CULT: NORMAL

## 2025-05-27 ENCOUNTER — APPOINTMENT (OUTPATIENT)
Dept: ANTEPARTUM | Facility: CLINIC | Age: 35
End: 2025-05-27
Payer: COMMERCIAL

## 2025-05-27 VITALS
HEIGHT: 65 IN | WEIGHT: 233 LBS | BODY MASS INDEX: 38.82 KG/M2 | SYSTOLIC BLOOD PRESSURE: 113 MMHG | DIASTOLIC BLOOD PRESSURE: 79 MMHG | HEART RATE: 86 BPM

## 2025-05-27 DIAGNOSIS — Z3A.18 18 WEEKS GESTATION OF PREGNANCY: ICD-10-CM

## 2025-05-27 DIAGNOSIS — Z13.79 ENCOUNTER FOR OTHER SCREENING FOR GENETIC AND CHROMOSOMAL ANOMALIES: ICD-10-CM

## 2025-05-27 PROCEDURE — 36415 COLL VENOUS BLD VENIPUNCTURE: CPT

## 2025-05-29 LAB
2ND TRIMESTER 4 SCREEN SERPL-IMP: NO
AFP ADJ MOM SERPL: 1.91
AFP INTERP SERPL-IMP: NORMAL
AFP INTERP SERPL-IMP: NORMAL
AFP MOM CUT-OFF: 2.5
AFP PERCENTILE: 97.8
AFP SERPL-ACNC: 62.92 NG/ML
CARBAMAZEPINE?: NO
CURRENT SMOKER: NORMAL
DIABETES STATUS PATIENT: NORMAL
GA: NORMAL
GESTATIONAL AGE METHOD: NORMAL
HX OF NTD NARR: NORMAL
MULTIPLE PREGNANCY: NORMAL
NEURAL TUBE DEFECT RISK FETUS: NORMAL
NEURAL TUBE DEFECT RISK POP: NORMAL
TEST PERFORMANCE INFO SPEC: NORMAL
VALPROIC ACID?: NORMAL

## 2025-06-11 ENCOUNTER — APPOINTMENT (OUTPATIENT)
Dept: ANTEPARTUM | Facility: CLINIC | Age: 35
End: 2025-06-11
Payer: COMMERCIAL

## 2025-06-11 ENCOUNTER — ASOB RESULT (OUTPATIENT)
Age: 35
End: 2025-06-11

## 2025-06-11 VITALS
WEIGHT: 235 LBS | BODY MASS INDEX: 39.15 KG/M2 | HEIGHT: 65 IN | DIASTOLIC BLOOD PRESSURE: 82 MMHG | SYSTOLIC BLOOD PRESSURE: 122 MMHG | HEART RATE: 90 BPM

## 2025-06-11 PROCEDURE — 76817 TRANSVAGINAL US OBSTETRIC: CPT

## 2025-06-11 PROCEDURE — 76811 OB US DETAILED SNGL FETUS: CPT

## 2025-06-24 ENCOUNTER — APPOINTMENT (OUTPATIENT)
Age: 35
End: 2025-06-24
Payer: COMMERCIAL

## 2025-06-24 VITALS
SYSTOLIC BLOOD PRESSURE: 115 MMHG | WEIGHT: 238 LBS | HEIGHT: 65 IN | HEART RATE: 82 BPM | DIASTOLIC BLOOD PRESSURE: 76 MMHG | BODY MASS INDEX: 39.65 KG/M2

## 2025-06-24 PROCEDURE — 0502F SUBSEQUENT PRENATAL CARE: CPT

## 2025-06-30 ENCOUNTER — APPOINTMENT (OUTPATIENT)
Dept: SURGERY | Facility: CLINIC | Age: 35
End: 2025-06-30